# Patient Record
Sex: FEMALE | Race: WHITE | NOT HISPANIC OR LATINO | Employment: OTHER | URBAN - METROPOLITAN AREA
[De-identification: names, ages, dates, MRNs, and addresses within clinical notes are randomized per-mention and may not be internally consistent; named-entity substitution may affect disease eponyms.]

---

## 2017-05-04 ENCOUNTER — CONVERSION ENCOUNTER (OUTPATIENT)
Dept: MAMMOGRAPHY | Facility: HOSPITAL | Age: 82
End: 2017-05-04

## 2018-04-25 ENCOUNTER — OFFICE VISIT CONVERTED (OUTPATIENT)
Dept: INTERNAL MEDICINE | Facility: CLINIC | Age: 83
End: 2018-04-25
Attending: INTERNAL MEDICINE

## 2018-08-02 ENCOUNTER — HOSPITAL ENCOUNTER (OUTPATIENT)
Dept: SLEEP MEDICINE | Facility: HOSPITAL | Age: 83
Discharge: HOME | End: 2018-08-02
Attending: INTERNAL MEDICINE | Admitting: INTERNAL MEDICINE

## 2018-08-11 ENCOUNTER — HOSPITAL ENCOUNTER (OUTPATIENT)
Dept: SLEEP MEDICINE | Facility: HOSPITAL | Age: 83
Discharge: HOME | End: 2018-08-11
Attending: INTERNAL MEDICINE | Admitting: INTERNAL MEDICINE

## 2018-08-22 ENCOUNTER — HOSPITAL ENCOUNTER (OUTPATIENT)
Dept: SLEEP MEDICINE | Facility: HOSPITAL | Age: 83
Discharge: HOME | End: 2018-08-22
Attending: INTERNAL MEDICINE | Admitting: INTERNAL MEDICINE

## 2018-08-29 ENCOUNTER — CONVERSION ENCOUNTER (OUTPATIENT)
Dept: INTERNAL MEDICINE | Facility: CLINIC | Age: 83
End: 2018-08-29

## 2018-08-29 ENCOUNTER — OFFICE VISIT CONVERTED (OUTPATIENT)
Dept: INTERNAL MEDICINE | Facility: CLINIC | Age: 83
End: 2018-08-29
Attending: PHYSICIAN ASSISTANT

## 2018-10-16 ENCOUNTER — OFFICE VISIT CONVERTED (OUTPATIENT)
Dept: INTERNAL MEDICINE | Facility: CLINIC | Age: 83
End: 2018-10-16
Attending: INTERNAL MEDICINE

## 2018-10-29 ENCOUNTER — HOSPITAL ENCOUNTER (OUTPATIENT)
Dept: SLEEP MEDICINE | Facility: HOSPITAL | Age: 83
Discharge: HOME | End: 2018-10-29
Attending: INTERNAL MEDICINE | Admitting: INTERNAL MEDICINE

## 2019-01-18 ENCOUNTER — HOSPITAL ENCOUNTER (OUTPATIENT)
Dept: MAMMOGRAPHY | Facility: HOSPITAL | Age: 84
Discharge: HOME | End: 2019-01-18
Attending: INTERNAL MEDICINE

## 2019-01-28 ENCOUNTER — OFFICE VISIT CONVERTED (OUTPATIENT)
Dept: INTERNAL MEDICINE | Facility: CLINIC | Age: 84
End: 2019-01-28
Attending: INTERNAL MEDICINE

## 2019-01-28 ENCOUNTER — HOSPITAL ENCOUNTER (OUTPATIENT)
Dept: OTHER | Facility: HOSPITAL | Age: 84
Discharge: HOME | End: 2019-01-28
Attending: INTERNAL MEDICINE

## 2019-02-01 ENCOUNTER — HOSPITAL ENCOUNTER (OUTPATIENT)
Dept: ULTRASOUND IMAGING | Facility: HOSPITAL | Age: 84
Discharge: HOME | End: 2019-02-01
Attending: INTERNAL MEDICINE

## 2019-02-01 ENCOUNTER — HOSPITAL ENCOUNTER (OUTPATIENT)
Dept: OTHER | Facility: HOSPITAL | Age: 84
Discharge: HOME | End: 2019-02-01
Attending: INTERNAL MEDICINE

## 2019-02-01 LAB
ALBUMIN SERPL-MCNC: 4.3 G/DL (ref 3.5–5)
ALBUMIN/GLOB SERPL: 1.2 {RATIO} (ref 1.4–2.6)
ALP SERPL-CCNC: 47 U/L (ref 43–160)
ALT SERPL-CCNC: 20 U/L (ref 10–40)
ANION GAP SERPL CALC-SCNC: 18 MMOL/L (ref 8–19)
AST SERPL-CCNC: 23 U/L (ref 15–50)
BASOPHILS # BLD AUTO: 0.03 10*3/UL (ref 0–0.2)
BASOPHILS NFR BLD AUTO: 0.36 % (ref 0–3)
BILIRUB SERPL-MCNC: 0.74 MG/DL (ref 0.2–1.3)
BUN SERPL-MCNC: 22 MG/DL (ref 5–25)
BUN/CREAT SERPL: 24 {RATIO} (ref 6–20)
CALCIUM SERPL-MCNC: 9.5 MG/DL (ref 8.7–10.4)
CHLORIDE SERPL-SCNC: 102 MMOL/L (ref 99–111)
CHOLEST SERPL-MCNC: 202 MG/DL (ref 107–200)
CHOLEST/HDLC SERPL: 4.3 {RATIO} (ref 3–6)
CONV CO2: 27 MMOL/L (ref 22–32)
CONV TOTAL PROTEIN: 7.8 G/DL (ref 6.3–8.2)
CREAT UR-MCNC: 0.92 MG/DL (ref 0.5–0.9)
EOSINOPHIL # BLD AUTO: 0.11 10*3/UL (ref 0–0.7)
EOSINOPHIL # BLD AUTO: 1.29 % (ref 0–7)
ERYTHROCYTE [DISTWIDTH] IN BLOOD BY AUTOMATED COUNT: 12.6 % (ref 11.5–14.5)
GFR SERPLBLD BASED ON 1.73 SQ M-ARVRAT: 56 ML/MIN/{1.73_M2}
GLOBULIN UR ELPH-MCNC: 3.5 G/DL (ref 2–3.5)
GLUCOSE SERPL-MCNC: 106 MG/DL (ref 65–99)
HBA1C MFR BLD: 15.7 G/DL (ref 12–16)
HCT VFR BLD AUTO: 44.7 % (ref 37–47)
HDLC SERPL-MCNC: 47 MG/DL (ref 40–60)
LDLC SERPL CALC-MCNC: 121 MG/DL (ref 70–100)
LYMPHOCYTES # BLD AUTO: 2.05 10*3/UL (ref 1–5)
MCH RBC QN AUTO: 31.6 PG (ref 27–31)
MCHC RBC AUTO-ENTMCNC: 35.2 G/DL (ref 33–37)
MCV RBC AUTO: 89.8 FL (ref 81–99)
MONOCYTES # BLD AUTO: 0.51 10*3/UL (ref 0.2–1.2)
MONOCYTES NFR BLD AUTO: 6.26 % (ref 3–10)
NEUTROPHILS # BLD AUTO: 5.48 10*3/UL (ref 2–8)
NEUTROPHILS NFR BLD AUTO: 67 % (ref 30–85)
NRBC BLD AUTO-RTO: 0 % (ref 0–0.01)
OSMOLALITY SERPL CALC.SUM OF ELEC: 300 MOSM/KG (ref 273–304)
PLATELET # BLD AUTO: 186 10*3/UL (ref 130–400)
PMV BLD AUTO: 9.7 FL (ref 7.4–10.4)
POTASSIUM SERPL-SCNC: 4.4 MMOL/L (ref 3.5–5.3)
RBC # BLD AUTO: 4.97 10*6/UL (ref 4.2–5.4)
SODIUM SERPL-SCNC: 143 MMOL/L (ref 135–147)
T4 FREE SERPL-MCNC: 1.1 NG/DL (ref 0.9–1.8)
TRIGL SERPL-MCNC: 172 MG/DL (ref 40–150)
TSH SERPL-ACNC: 1.24 M[IU]/L (ref 0.27–4.2)
VARIANT LYMPHS NFR BLD MANUAL: 25.1 % (ref 20–45)
VLDLC SERPL-MCNC: 34 MG/DL (ref 5–37)
WBC # BLD AUTO: 8.18 10*3/UL (ref 4.8–10.8)

## 2019-03-19 ENCOUNTER — OFFICE VISIT CONVERTED (OUTPATIENT)
Dept: INTERNAL MEDICINE | Facility: CLINIC | Age: 84
End: 2019-03-19
Attending: INTERNAL MEDICINE

## 2019-03-19 ENCOUNTER — HOSPITAL ENCOUNTER (OUTPATIENT)
Dept: OTHER | Facility: HOSPITAL | Age: 84
Discharge: HOME | End: 2019-03-19
Attending: INTERNAL MEDICINE

## 2019-03-22 LAB
AMOXICILLIN+CLAV SUSC ISLT: 4
AMPICILLIN SUSC ISLT: 8
AMPICILLIN+SULBAC SUSC ISLT: 4
BACTERIA UR CULT: ABNORMAL
CEFAZOLIN SUSC ISLT: <=4
CEFEPIME SUSC ISLT: <=1
CEFTAZIDIME SUSC ISLT: <=1
CEFTRIAXONE SUSC ISLT: <=1
CEFUROXIME ORAL SUSC ISLT: 4
CEFUROXIME PARENTER SUSC ISLT: 4
CIPROFLOXACIN SUSC ISLT: <=0.25
ERTAPENEM SUSC ISLT: <=0.5
GENTAMICIN SUSC ISLT: <=1
LEVOFLOXACIN SUSC ISLT: <=0.12
NITROFURANTOIN SUSC ISLT: <=16
TETRACYCLINE SUSC ISLT: <=1
TMP SMX SUSC ISLT: <=20
TOBRAMYCIN SUSC ISLT: <=1

## 2019-04-25 ENCOUNTER — HOSPITAL ENCOUNTER (OUTPATIENT)
Dept: SLEEP MEDICINE | Facility: HOSPITAL | Age: 84
Discharge: HOME | End: 2019-04-25
Attending: INTERNAL MEDICINE | Admitting: INTERNAL MEDICINE

## 2019-07-25 ENCOUNTER — CONVERSION ENCOUNTER (OUTPATIENT)
Dept: INTERNAL MEDICINE | Facility: CLINIC | Age: 84
End: 2019-07-25

## 2019-07-25 ENCOUNTER — OFFICE VISIT CONVERTED (OUTPATIENT)
Dept: INTERNAL MEDICINE | Facility: CLINIC | Age: 84
End: 2019-07-25
Attending: INTERNAL MEDICINE

## 2019-11-18 ENCOUNTER — OFFICE VISIT CONVERTED (OUTPATIENT)
Dept: INTERNAL MEDICINE | Facility: CLINIC | Age: 84
End: 2019-11-18
Attending: INTERNAL MEDICINE

## 2019-11-18 ENCOUNTER — CONVERSION ENCOUNTER (OUTPATIENT)
Dept: INTERNAL MEDICINE | Facility: CLINIC | Age: 84
End: 2019-11-18

## 2019-11-18 ENCOUNTER — HOSPITAL ENCOUNTER (OUTPATIENT)
Dept: OTHER | Facility: HOSPITAL | Age: 84
Discharge: HOME | End: 2019-11-18
Attending: INTERNAL MEDICINE

## 2019-11-18 LAB
ALBUMIN SERPL-MCNC: 4.3 G/DL (ref 3.5–5)
ALBUMIN/GLOB SERPL: 1.3 {RATIO} (ref 1.4–2.6)
ALP SERPL-CCNC: 48 U/L (ref 43–160)
ALT SERPL-CCNC: 23 U/L (ref 10–40)
ANION GAP SERPL CALC-SCNC: 20 MMOL/L (ref 8–19)
AST SERPL-CCNC: 32 U/L (ref 15–50)
BASOPHILS # BLD AUTO: 0.06 10*3/UL (ref 0–0.2)
BASOPHILS NFR BLD AUTO: 0.8 % (ref 0–3)
BILIRUB SERPL-MCNC: 1.11 MG/DL (ref 0.2–1.3)
BUN SERPL-MCNC: 21 MG/DL (ref 5–25)
BUN/CREAT SERPL: 24 {RATIO} (ref 6–20)
CALCIUM SERPL-MCNC: 10.4 MG/DL (ref 8.7–10.4)
CHLORIDE SERPL-SCNC: 100 MMOL/L (ref 99–111)
CHOLEST SERPL-MCNC: 217 MG/DL (ref 107–200)
CHOLEST/HDLC SERPL: 4.1 {RATIO} (ref 3–6)
CONV ABS IMM GRAN: 0.03 10*3/UL (ref 0–0.2)
CONV CO2: 27 MMOL/L (ref 22–32)
CONV IMMATURE GRAN: 0.4 % (ref 0–1.8)
CONV TOTAL PROTEIN: 7.6 G/DL (ref 6.3–8.2)
CREAT UR-MCNC: 0.87 MG/DL (ref 0.5–0.9)
DEPRECATED RDW RBC AUTO: 45.2 FL (ref 36.4–46.3)
EOSINOPHIL # BLD AUTO: 0.11 10*3/UL (ref 0–0.7)
EOSINOPHIL # BLD AUTO: 1.4 % (ref 0–7)
ERYTHROCYTE [DISTWIDTH] IN BLOOD BY AUTOMATED COUNT: 13.1 % (ref 11.7–14.4)
GFR SERPLBLD BASED ON 1.73 SQ M-ARVRAT: 59 ML/MIN/{1.73_M2}
GLOBULIN UR ELPH-MCNC: 3.3 G/DL (ref 2–3.5)
GLUCOSE SERPL-MCNC: 94 MG/DL (ref 65–99)
HCT VFR BLD AUTO: 45.2 % (ref 37–47)
HDLC SERPL-MCNC: 53 MG/DL (ref 40–60)
HGB BLD-MCNC: 15.4 G/DL (ref 12–16)
LDLC SERPL CALC-MCNC: 131 MG/DL (ref 70–100)
LYMPHOCYTES # BLD AUTO: 2.44 10*3/UL (ref 1–5)
LYMPHOCYTES NFR BLD AUTO: 30.8 % (ref 20–45)
MCH RBC QN AUTO: 32.2 PG (ref 27–31)
MCHC RBC AUTO-ENTMCNC: 34.1 G/DL (ref 33–37)
MCV RBC AUTO: 94.4 FL (ref 81–99)
MONOCYTES # BLD AUTO: 0.57 10*3/UL (ref 0.2–1.2)
MONOCYTES NFR BLD AUTO: 7.2 % (ref 3–10)
NEUTROPHILS # BLD AUTO: 4.71 10*3/UL (ref 2–8)
NEUTROPHILS NFR BLD AUTO: 59.4 % (ref 30–85)
NRBC CBCN: 0 % (ref 0–0.7)
OSMOLALITY SERPL CALC.SUM OF ELEC: 299 MOSM/KG (ref 273–304)
PLATELET # BLD AUTO: 201 10*3/UL (ref 130–400)
PMV BLD AUTO: 12.7 FL (ref 9.4–12.3)
POTASSIUM SERPL-SCNC: 4.4 MMOL/L (ref 3.5–5.3)
RBC # BLD AUTO: 4.79 10*6/UL (ref 4.2–5.4)
SODIUM SERPL-SCNC: 143 MMOL/L (ref 135–147)
T4 FREE SERPL-MCNC: 1.1 NG/DL (ref 0.9–1.8)
TRIGL SERPL-MCNC: 167 MG/DL (ref 40–150)
TSH SERPL-ACNC: 1.45 M[IU]/L (ref 0.27–4.2)
VLDLC SERPL-MCNC: 33 MG/DL (ref 5–37)
WBC # BLD AUTO: 7.92 10*3/UL (ref 4.8–10.8)

## 2020-02-18 ENCOUNTER — CONVERSION ENCOUNTER (OUTPATIENT)
Dept: INTERNAL MEDICINE | Facility: CLINIC | Age: 85
End: 2020-02-18

## 2020-02-18 ENCOUNTER — OFFICE VISIT CONVERTED (OUTPATIENT)
Dept: INTERNAL MEDICINE | Facility: CLINIC | Age: 85
End: 2020-02-18
Attending: INTERNAL MEDICINE

## 2020-02-20 ENCOUNTER — HOSPITAL ENCOUNTER (OUTPATIENT)
Dept: OTHER | Facility: HOSPITAL | Age: 85
Discharge: HOME | End: 2020-02-20
Attending: INTERNAL MEDICINE

## 2020-02-20 LAB
CREAT BLD-MCNC: 0.8 MG/DL (ref 0.6–1.4)
GFR SERPLBLD BASED ON 1.73 SQ M-ARVRAT: >60 ML/MIN/{1.73_M2}

## 2020-07-14 ENCOUNTER — OFFICE VISIT CONVERTED (OUTPATIENT)
Dept: INTERNAL MEDICINE | Facility: CLINIC | Age: 85
End: 2020-07-14
Attending: NURSE PRACTITIONER

## 2020-07-14 ENCOUNTER — HOSPITAL ENCOUNTER (OUTPATIENT)
Dept: OTHER | Facility: HOSPITAL | Age: 85
Discharge: HOME | End: 2020-07-14
Attending: NURSE PRACTITIONER

## 2020-07-14 LAB
ALBUMIN SERPL-MCNC: 4.5 G/DL (ref 3.5–5)
ALBUMIN/GLOB SERPL: 1.6 {RATIO} (ref 1.4–2.6)
ALP SERPL-CCNC: 44 U/L (ref 43–160)
ALT SERPL-CCNC: 20 U/L (ref 10–40)
ANION GAP SERPL CALC-SCNC: 17 MMOL/L (ref 8–19)
AST SERPL-CCNC: 24 U/L (ref 15–50)
BASOPHILS # BLD AUTO: 0.05 10*3/UL (ref 0–0.2)
BASOPHILS NFR BLD AUTO: 0.7 % (ref 0–3)
BILIRUB SERPL-MCNC: 0.57 MG/DL (ref 0.2–1.3)
BUN SERPL-MCNC: 25 MG/DL (ref 5–25)
BUN/CREAT SERPL: 27 {RATIO} (ref 6–20)
CALCIUM SERPL-MCNC: 10.2 MG/DL (ref 8.7–10.4)
CHLORIDE SERPL-SCNC: 100 MMOL/L (ref 99–111)
CHOLEST SERPL-MCNC: 221 MG/DL (ref 107–200)
CHOLEST/HDLC SERPL: 4.8 {RATIO} (ref 3–6)
CONV ABS IMM GRAN: 0.03 10*3/UL (ref 0–0.2)
CONV CO2: 29 MMOL/L (ref 22–32)
CONV IMMATURE GRAN: 0.4 % (ref 0–1.8)
CONV TOTAL PROTEIN: 7.4 G/DL (ref 6.3–8.2)
CREAT UR-MCNC: 0.92 MG/DL (ref 0.5–0.9)
DEPRECATED RDW RBC AUTO: 48.8 FL (ref 36.4–46.3)
EOSINOPHIL # BLD AUTO: 0.12 10*3/UL (ref 0–0.7)
EOSINOPHIL # BLD AUTO: 1.7 % (ref 0–7)
ERYTHROCYTE [DISTWIDTH] IN BLOOD BY AUTOMATED COUNT: 13.5 % (ref 11.7–14.4)
GFR SERPLBLD BASED ON 1.73 SQ M-ARVRAT: 55 ML/MIN/{1.73_M2}
GLOBULIN UR ELPH-MCNC: 2.9 G/DL (ref 2–3.5)
GLUCOSE SERPL-MCNC: 90 MG/DL (ref 65–99)
HCT VFR BLD AUTO: 46.2 % (ref 37–47)
HDLC SERPL-MCNC: 46 MG/DL (ref 40–60)
HGB BLD-MCNC: 14.5 G/DL (ref 12–16)
LDLC SERPL CALC-MCNC: 133 MG/DL (ref 70–100)
LYMPHOCYTES # BLD AUTO: 2.06 10*3/UL (ref 1–5)
LYMPHOCYTES NFR BLD AUTO: 28.9 % (ref 20–45)
MCH RBC QN AUTO: 30.4 PG (ref 27–31)
MCHC RBC AUTO-ENTMCNC: 31.4 G/DL (ref 33–37)
MCV RBC AUTO: 96.9 FL (ref 81–99)
MONOCYTES # BLD AUTO: 0.49 10*3/UL (ref 0.2–1.2)
MONOCYTES NFR BLD AUTO: 6.9 % (ref 3–10)
NEUTROPHILS # BLD AUTO: 4.38 10*3/UL (ref 2–8)
NEUTROPHILS NFR BLD AUTO: 61.4 % (ref 30–85)
NRBC CBCN: 0 % (ref 0–0.7)
OSMOLALITY SERPL CALC.SUM OF ELEC: 298 MOSM/KG (ref 273–304)
PLATELET # BLD AUTO: 208 10*3/UL (ref 130–400)
PMV BLD AUTO: 12.3 FL (ref 9.4–12.3)
POTASSIUM SERPL-SCNC: 4.2 MMOL/L (ref 3.5–5.3)
RBC # BLD AUTO: 4.77 10*6/UL (ref 4.2–5.4)
SODIUM SERPL-SCNC: 142 MMOL/L (ref 135–147)
T4 FREE SERPL-MCNC: 1 NG/DL (ref 0.9–1.8)
TRIGL SERPL-MCNC: 211 MG/DL (ref 40–150)
TSH SERPL-ACNC: 1.08 M[IU]/L (ref 0.27–4.2)
VLDLC SERPL-MCNC: 42 MG/DL (ref 5–37)
WBC # BLD AUTO: 7.13 10*3/UL (ref 4.8–10.8)

## 2020-07-15 ENCOUNTER — HOSPITAL ENCOUNTER (OUTPATIENT)
Dept: OTHER | Facility: HOSPITAL | Age: 85
Discharge: HOME | End: 2020-07-15
Attending: NURSE PRACTITIONER

## 2020-07-15 ENCOUNTER — CONVERSION ENCOUNTER (OUTPATIENT)
Dept: NEUROLOGY | Facility: CLINIC | Age: 85
End: 2020-07-15

## 2020-07-15 ENCOUNTER — OFFICE VISIT CONVERTED (OUTPATIENT)
Dept: NEUROLOGY | Facility: CLINIC | Age: 85
End: 2020-07-15
Attending: NURSE PRACTITIONER

## 2020-07-15 LAB — HCYS SERPL-SCNC: 10.4 UMOL/L (ref 3.7–13.9)

## 2020-07-16 LAB
FOLATE SERPL-MCNC: >20 NG/ML (ref 4.8–20)
VIT B12 SERPL-MCNC: 1234 PG/ML (ref 211–911)

## 2020-07-19 LAB — METHYLMALONATE SERPL-SCNC: 209 NMOL/L (ref 0–378)

## 2020-08-10 ENCOUNTER — HOSPITAL ENCOUNTER (OUTPATIENT)
Dept: CARDIOLOGY | Facility: HOSPITAL | Age: 85
Discharge: HOME | End: 2020-08-10
Attending: NURSE PRACTITIONER

## 2020-09-11 ENCOUNTER — OFFICE VISIT CONVERTED (OUTPATIENT)
Dept: INTERNAL MEDICINE | Facility: CLINIC | Age: 85
End: 2020-09-11
Attending: PHYSICIAN ASSISTANT

## 2020-09-16 ENCOUNTER — OFFICE VISIT CONVERTED (OUTPATIENT)
Dept: NEUROLOGY | Facility: CLINIC | Age: 85
End: 2020-09-16
Attending: NURSE PRACTITIONER

## 2020-10-23 ENCOUNTER — OFFICE VISIT CONVERTED (OUTPATIENT)
Dept: INTERNAL MEDICINE | Facility: CLINIC | Age: 85
End: 2020-10-23
Attending: INTERNAL MEDICINE

## 2020-10-23 ENCOUNTER — CONVERSION ENCOUNTER (OUTPATIENT)
Dept: INTERNAL MEDICINE | Facility: CLINIC | Age: 85
End: 2020-10-23

## 2020-12-04 ENCOUNTER — CONVERSION ENCOUNTER (OUTPATIENT)
Dept: INTERNAL MEDICINE | Facility: CLINIC | Age: 85
End: 2020-12-04

## 2020-12-04 ENCOUNTER — OFFICE VISIT CONVERTED (OUTPATIENT)
Dept: INTERNAL MEDICINE | Facility: CLINIC | Age: 85
End: 2020-12-04
Attending: INTERNAL MEDICINE

## 2020-12-17 ENCOUNTER — OFFICE VISIT CONVERTED (OUTPATIENT)
Dept: NEUROLOGY | Facility: CLINIC | Age: 85
End: 2020-12-17
Attending: NURSE PRACTITIONER

## 2021-02-26 ENCOUNTER — HOSPITAL ENCOUNTER (OUTPATIENT)
Dept: VACCINE CLINIC | Facility: HOSPITAL | Age: 86
Discharge: HOME | End: 2021-02-26
Attending: INTERNAL MEDICINE

## 2021-03-05 ENCOUNTER — HOSPITAL ENCOUNTER (OUTPATIENT)
Dept: OTHER | Facility: HOSPITAL | Age: 86
Discharge: HOME | End: 2021-03-05
Attending: INTERNAL MEDICINE

## 2021-03-05 ENCOUNTER — OFFICE VISIT CONVERTED (OUTPATIENT)
Dept: INTERNAL MEDICINE | Facility: CLINIC | Age: 86
End: 2021-03-05
Attending: INTERNAL MEDICINE

## 2021-03-05 ENCOUNTER — CONVERSION ENCOUNTER (OUTPATIENT)
Dept: INTERNAL MEDICINE | Facility: CLINIC | Age: 86
End: 2021-03-05

## 2021-03-05 LAB
ALBUMIN SERPL-MCNC: 4.3 G/DL (ref 3.5–5)
ALBUMIN/GLOB SERPL: 1.2 {RATIO} (ref 1.4–2.6)
ALP SERPL-CCNC: 50 U/L (ref 43–160)
ALT SERPL-CCNC: 28 U/L (ref 10–40)
ANION GAP SERPL CALC-SCNC: 16 MMOL/L (ref 8–19)
AST SERPL-CCNC: 29 U/L (ref 15–50)
BASOPHILS # BLD AUTO: 0.05 10*3/UL (ref 0–0.2)
BASOPHILS NFR BLD AUTO: 0.7 % (ref 0–3)
BILIRUB SERPL-MCNC: 0.87 MG/DL (ref 0.2–1.3)
BILIRUB UR QL STRIP: NEGATIVE
BUN SERPL-MCNC: 27 MG/DL (ref 5–25)
BUN/CREAT SERPL: 28 {RATIO} (ref 6–20)
CALCIUM SERPL-MCNC: 10 MG/DL (ref 8.7–10.4)
CHLORIDE SERPL-SCNC: 100 MMOL/L (ref 99–111)
CHOLEST SERPL-MCNC: 199 MG/DL (ref 107–200)
CHOLEST/HDLC SERPL: 3.8 {RATIO} (ref 3–6)
COLOR UR: YELLOW
CONV ABS IMM GRAN: 0.04 10*3/UL (ref 0–0.2)
CONV CLARITY OF URINE: CLEAR
CONV CO2: 29 MMOL/L (ref 22–32)
CONV IMMATURE GRAN: 0.5 % (ref 0–1.8)
CONV PROTEIN IN URINE BY AUTOMATED TEST STRIP: NEGATIVE
CONV TOTAL PROTEIN: 7.8 G/DL (ref 6.3–8.2)
CONV UROBILINOGEN IN URINE BY AUTOMATED TEST STRIP: NORMAL
CREAT UR-MCNC: 0.96 MG/DL (ref 0.5–0.9)
DEPRECATED RDW RBC AUTO: 45.7 FL (ref 36.4–46.3)
EOSINOPHIL # BLD AUTO: 0.12 10*3/UL (ref 0–0.7)
EOSINOPHIL # BLD AUTO: 1.6 % (ref 0–7)
ERYTHROCYTE [DISTWIDTH] IN BLOOD BY AUTOMATED COUNT: 13.4 % (ref 11.7–14.4)
GFR SERPLBLD BASED ON 1.73 SQ M-ARVRAT: 52 ML/MIN/{1.73_M2}
GLOBULIN UR ELPH-MCNC: 3.5 G/DL (ref 2–3.5)
GLUCOSE SERPL-MCNC: 98 MG/DL (ref 65–99)
GLUCOSE UR QL: NEGATIVE
HCT VFR BLD AUTO: 45.5 % (ref 37–47)
HDLC SERPL-MCNC: 52 MG/DL (ref 40–60)
HGB BLD-MCNC: 14.6 G/DL (ref 12–16)
HGB UR QL STRIP: NEGATIVE
KETONES UR QL STRIP: NEGATIVE
LDLC SERPL CALC-MCNC: 119 MG/DL (ref 70–100)
LEUKOCYTE ESTERASE UR QL STRIP: NEGATIVE
LYMPHOCYTES # BLD AUTO: 2.04 10*3/UL (ref 1–5)
LYMPHOCYTES NFR BLD AUTO: 26.8 % (ref 20–45)
MCH RBC QN AUTO: 30.1 PG (ref 27–31)
MCHC RBC AUTO-ENTMCNC: 32.1 G/DL (ref 33–37)
MCV RBC AUTO: 93.8 FL (ref 81–99)
MONOCYTES # BLD AUTO: 0.64 10*3/UL (ref 0.2–1.2)
MONOCYTES NFR BLD AUTO: 8.4 % (ref 3–10)
NEUTROPHILS # BLD AUTO: 4.73 10*3/UL (ref 2–8)
NEUTROPHILS NFR BLD AUTO: 62 % (ref 30–85)
NITRITE UR QL STRIP: NEGATIVE
NRBC CBCN: 0 % (ref 0–0.7)
OSMOLALITY SERPL CALC.SUM OF ELEC: 297 MOSM/KG (ref 273–304)
PH UR STRIP.AUTO: 5 [PH]
PLATELET # BLD AUTO: 238 10*3/UL (ref 130–400)
PMV BLD AUTO: 11.5 FL (ref 9.4–12.3)
POTASSIUM SERPL-SCNC: 3.9 MMOL/L (ref 3.5–5.3)
RBC # BLD AUTO: 4.85 10*6/UL (ref 4.2–5.4)
SODIUM SERPL-SCNC: 141 MMOL/L (ref 135–147)
SP GR UR: 1.02
TRIGL SERPL-MCNC: 142 MG/DL (ref 40–150)
TSH SERPL-ACNC: 1.37 M[IU]/L (ref 0.27–4.2)
VLDLC SERPL-MCNC: 28 MG/DL (ref 5–37)
WBC # BLD AUTO: 7.62 10*3/UL (ref 4.8–10.8)

## 2021-03-15 ENCOUNTER — OFFICE VISIT CONVERTED (OUTPATIENT)
Dept: NEUROLOGY | Facility: CLINIC | Age: 86
End: 2021-03-15
Attending: NURSE PRACTITIONER

## 2021-03-26 ENCOUNTER — HOSPITAL ENCOUNTER (OUTPATIENT)
Dept: VACCINE CLINIC | Facility: HOSPITAL | Age: 86
Discharge: HOME | End: 2021-03-26
Attending: INTERNAL MEDICINE

## 2021-04-26 ENCOUNTER — OFFICE VISIT CONVERTED (OUTPATIENT)
Dept: INTERNAL MEDICINE | Facility: CLINIC | Age: 86
End: 2021-04-26
Attending: NURSE PRACTITIONER

## 2021-04-26 ENCOUNTER — CONVERSION ENCOUNTER (OUTPATIENT)
Dept: INTERNAL MEDICINE | Facility: CLINIC | Age: 86
End: 2021-04-26

## 2021-05-07 ENCOUNTER — HOSPITAL ENCOUNTER (OUTPATIENT)
Dept: OTHER | Facility: HOSPITAL | Age: 86
Discharge: HOME | End: 2021-05-07
Attending: NURSE PRACTITIONER

## 2021-05-10 NOTE — H&P
History and Physical      Patient Name: Osiris Vila   Patient ID: [id number]   Sex: Female   YOB: 1931    Primary Care Provider: Ave Raya MD   Referring Provider: Ave Raya MD    Visit Date: July 15, 2020    Provider: SUNDAR Horta   Location: [facility]   Location Address: [facility] [place] [employer]  [address] [place] [address]  [id number]   Location Phone: [phone]          Chief Complaint     memory concerns       History Of Present Illness  Osiris Vila is a 88 year old /White female who presents today to [facility] today referred by Ave Raya MD for evaluation of memory. States that memory loss was first noticed about 3 years ago. Family has noticed any memory difficulty. Is having difficulty with short term memory. Endorses difficulty remembering appointments. Endorses difficulty handling financial affairs, such as balancing checkbook and paying bills timely. Does not feel as if she could learn a new tool, appliance or gadget. Endorses decreased interest in hobbies or activities. Endorses depression. Denies homicidal ideation and suicidal ideation. Endorses anxiety. Does experience repetitive questioning. Denies difficulty with judgment, bad financial decisions, and impulsivity.   Does not live alone. Does not drive.   DENIES/ENDORSES family history of Alzheimer's Disease.      Son states that she will leave things out on the counter and forget that they're there. They are concerned about her ability to take care of herself.  She no longer uses the stove.  Lives with daughter but daughter works during the day.     MRI Brain:   There is mild prominence of the cerebral sulci and fissures indicating cortical atrophy.  The   ventricles and basal cisterns are well maintained and normal.  There is an area of encephalomalacia   in the left occipital-parietal lobe consistent with an old infarct.  There has been interval   development of a small  porencephalic cyst located posterior to the corpus callosum in the   anteromedial left occipital lobe measuring 1.3 cm felt to be due to old ischemic changes.  There   are abnormal signal changes in the periventricular white matter tracts and subcortical white matter   consistent with chronic microvascular ischemia.  There are also superimposed tiny old lacunar   infarcts noted within the periventricular white matter and basal ganglia.  There is no restricted   diffusion to indicate acute ischemia.  There is no abnormal contrast enhancement.  There is no   acute hemorrhage, midline shift, or suspicious extra-axial fluid collections.  There is normal T2   weighted signal seen within the intracranial arteries and the major dural sinuses.  The orbital   contents are normal.  The paranasal and mastoid sinuses are clear.    MoCA 15/30       Past Medical History  Actinic keratosis; Allergic rhinitis; Arthritis; Back pain; Colitis, Ulcerative; Deafness; GERD (gastroesophageal reflux disease); Hearing aid worn; Hearing loss; Hernia; Hiatal hernia; Hypertension; Hypertension, Benign Essential; Iron deficiency; Leg swelling; Lumbago; Seborrheic dermatitis; Sinus Trouble; unspecified; SOB (shortness of breath); Vitamin D Deficiency         Past Surgical History  Bladder Surg.; Cataract surgery; Cholecystectomy; Colonoscopy; EGD; Hernia (Hiatal); Hysterectomy; Knee replacement, left; Sinus Surgery         Medication List  8 Hour Pain Reliever 650 mg oral tablet extended release; Allergy Injections; Calcium Citrate + D 315-200 mg-unit oral tablet; chlorthalidone 25 mg oral tablet; Eliquis 5 mg oral tablet; hydrocodone-acetaminophen 5-325 mg oral tablet; lisinopril 10 mg oral tablet; multivitamin oral tablet; mupirocin 2 % topical ointment; Omega-3 350 mg-235 mg- 90 mg-597 mg oral capsule,delayed release(DR/EC); sertraline 100 mg oral tablet; Vitamin B-12 oral; Vitamin C 500 mg oral tablet         Allergy List  atenolol;  bisoprolol fumarate; clonidine; diltiazem HCl; doxycycline hyclate; erythromycin; fosinopril; Keflex; lisinopril; Macrobid; Molds; oxybutynin; TETRACYCLINES; triamterene; trimethoprim; verapamil; yeast       Allergies Reconciled  Family Medical History  Pancreatic Neoplasm, Malignant; Family history of Arthritis; Family history of cancer; Family history of heart disease; Family history of diabetes mellitus         Reproductive History   3 Para 3 0 0 0 & Postmenopausal       Social History  Alcohol (Never); Retired; Tobacco (Former)         Immunizations  Name Date Admin   Influenza    [id number]    Prevnar 13          Review of Systems  · Constitutional  o Admits  o : fatigue, weight gain  o Denies  o : chills, excessive sweating, fever, sycope/passing out, weight loss  · Eyes  o Denies  o : changes in vision, blurry vision, double vision  · HENT  o Admits  o : loss of hearing, ear aches, seasonal allergies  o Denies  o : ringing in the ears, sore throat, nasal congestion, sinus pain, nose bleeds  · Cardiovascular  o Denies  o : blood clots, swollen legs, anemia, easy burising or bleeding, transfusions  · Respiratory  o Denies  o : shortness of breath, dry cough, productive cough, pneumonia, COPD  · Gastrointestinal  o Denies  o : difficulty swallowing, reflux  · Genitourinary  o Admits  o : incontinence  · Neurologic  o Denies  o : headache, seizure, stroke, tremor, loss of balance, falls, dizziness/vertigo, difficulty with sleep, numbness/tingling/paresthesia , difficulty with coordination, difficulty with dexterity, weakness  · Musculoskeletal  o Admits  o : joint pain  o Denies  o : neck stiffness/pain, swollen lymph nodes, muscle aches, weakness, spasms, sciatica, pain radiating in arm, pain radiating in leg, low back pain  · Endocrine  o Denies  o : diabetes, thyroid disorder  · Psychiatric  o Denies  o : anxiety, depression      Vitals  Date Time BP Position Site L\R Cuff Size HR RR TEMP (F) WT  HT   "BMI kg/m2 BSA m2 O2 Sat        07/15/2020 08:05 /80 Sitting    66 - R  96.9 192lbs 2oz 5'  2\" 35.14 1.95           Physical Examination  · Constitutional  o Appearance  o : well-nourished, well groomed, in no apparent distress  · Eyes  o Pupils and Irises  o : Pupils equal, round, and reactive to light and accommodation bilaterally  · Respiratory  o Auscultation of Lungs  o : Lungs were clear to ascultation bilaterally. No wheezes, rhonchi or rales were appreciated.  · Cardiovascular  o Heart  o :   § Auscultation of Heart  § : Regular rate and rhythm, no murmurs, gallops or rubs were appreciated.  o Peripheral Vascular System  o :   § Extremities  § : No peripheral edema was appreciated  · Musculoskeletal  o General  o : Normal bulk and normal tone throughout. 5/5 motor strength throughout and symmetric.   · Neurologic  o Cranial Nerves  o : Pupils are equal, round and reactive to light. Extraocular movements are intact. Visual fields are full. Fundoscopic examination reveals sharp disc bilaterally. Sensation in the V1-V3 distribution is intact and symmetric. Muscles of mastication are strong and symmetric. Muscles of facial expression are strong and symmetric. Hearing is intact. Palatal raise is intact and symmetric. Uvula is midline. Shoulder shrug is strong. Tongue protrudes in the midline.  o Motor Examination  o :   § RUE Strength  § : strength normal  § LUE Strength  § : strength normal  § RLE Strength  § : strength normal  § LLE Strength  § : strength normal  o Reflexes  o : 2+ reflexes throughout and symmetric. Negative Lin. Negative Babinski.   o Sensation  o : Intact sensation to light touch, pinprick, vibration and proprioception throughout.  o Gait and Station  o :   § Gait Screening  § : Antalgic gait  o Coordination  o : Intact finger to nose and heel to shin. Rapid alternating movements are intact in the upper and lower " extremities.          Assessment  · Dementia     294.20/F03.90  Significant vascular disease and old stroke on MRI brain. Likely a vascular dementia. Will order carotid doppler to evaluate for occlusive disease secondary to severe cerebrovascular disease. Will consider statin in follow-up. Will order labs to look for reversible causes of memory loss. Will refer to the Alzheimer [employer] for resources and education for her family. Will start donepezil for memory.   · Stroke     434.91/I63.9    Problems Reconciled  Plan  · Orders  o Folate level (49881) - 294.20/F03.90, 434.91/I63.9 - 07/15/2020  o Homocysteine (78142) - 294.20/F03.90, 434.91/I63.9 - 07/15/2020  o Methylmalonic acid (78108) - 294.20/F03.90, 434.91/I63.9 - 07/15/2020  o Vitamin B12 level (75075) - 294.20/F03.90, 434.91/I63.9 - 07/15/2020  o Carotid Doppler Bilateral HMH (72075) - 434.91/I63.9 - 07/15/2020  · Medications  o donepezil 10 mg oral tablet   SIG: take 1/2 tablet (5 mg) by oral route once daily in the evening for 2 weeks, then increase to 1 tablet (10mg) qHS.   DISP: (30) tablets with 2 refills  Prescribed on 07/15/2020     o Medications have been Reconciled  o Transition of Care or Provider Policy  · Instructions  o Encouraged to follow-up with Primary Care Provider for preventative care.  o Call or Return if symptoms worsen or persist.   o Follow Up in 6 weeks.  o Greater than 60 minutes spent face to face with the patient with greater than 50% of that time used for education regarding stroke and dementia.   · Disposition  o Call or Return if symptoms worsen or persist.            Electronically Signed by: SUNDAR Horta -Author on July 15, 2020 02:47:50 PM

## 2021-05-13 NOTE — PROGRESS NOTES
"   Progress Note      Patient Name: Osiris Vila   Patient ID: [id number]   Sex: Female   YOB: 1931    Primary Care Provider: Ave Raya MD   Referring Provider: Ave Raya MD    Visit Date: September 11, 2020    Provider: Ashleigh Villeda PA-C   Location: [facility]   Location Address: [address] [place] [address], [address]  [place], [place]  [id number]   Location Phone: [phone]          Chief Complaint  · scalp itching      History Of Present Illness  Osiris Vila is a 88 year old /White female who presents for evaluation and treatment of:      scalping itching, burning and losing hair that started years ago. She states she has not tried anything in a \"serious fashion\" recently. She states this comes every year in August and she thinks it needs to be taken care of by a physician. She states the hair loss has been gradual. She states she scratches it often and it gets red. She states she has been off allergy shots for 2 years. She states she is using coconut oil shampoo. She states her ears itch too and she gets fungus growing on her ears.  She has seen Dermatology in the past but told nothing was wrong.     Denies taking anything for allergies now, spreading of itching and burning on face and ears.  Denies runny nose, nasal congestion, sore throat, fever.       Past Medical History  Disease Name Date Onset Notes   Actinic keratosis 09/20/2016 lesions frozen today   Allergic rhinitis --  --    Arthritis --  --    Back pain --  --    Colitis, Ulcerative --  --    Deafness --  --    GERD (gastroesophageal reflux disease) 09/20/2016 omeprazole for 4-6 months old, discussed risks   Hearing aid worn --  --    Hearing loss --  --    Hernia --  --    Hiatal hernia --  --    Hypertension --  --    Hypertension, Benign Essential --  --    Iron deficiency --  --    Leg swelling --  --    Lumbago 06/23/2015 chronic, will refill meds when she is out of the meds she gets from " [facility] will need UDS at that time completed consent today   Seborrheic dermatitis 06/23/2015 will continue treatment with ketakonazole shampoo and have her follow up with derm   Sinus Trouble; unspecified --  --    SOB (shortness of breath) --  --    Vitamin D Deficiency --  --          Past Surgical History  Procedure Name Date Notes   Bladder Surg. 1989 --    Cataract surgery --  --    Cholecystectomy 1973 --    Colonoscopy 2013 --    EGD 2013 --    Hernia (Hiatal) --  --    Hysterectomy 1986 --    Knee replacement, left 2012 --    Sinus Surgery 1993, 1998, 2000 --          Medication List  Name Date Started Instructions   4 Wheel Walker with Seat 1 Walker 07/27/2020 Use to help with mobility as needed   8 Hour Pain Reliever 650 mg oral tablet extended release  --    Allergy Injections  As Directed   Calcium Citrate + D 315-200 mg-unit oral tablet  --    chlorthalidone 25 mg oral tablet 08/03/2020 TAKE ONE TABLET BY MOUTH EVERY MORNING   donepezil 10 mg oral tablet 07/15/2020 take 1/2 tablet (5 mg) by oral route once daily in the evening for 2 weeks, then increase to 1 tablet (10mg) qHS.   Eliquis 5 mg oral tablet 08/14/2020 Take 1 tablet by mouth twice daily   hydrocodone-acetaminophen 5-325 mg oral tablet 08/18/2020 take 1 tablet by oral route every 6 hours as needed for pain   lisinopril 10 mg oral tablet 08/24/2020 take 1 tablet (10 mg) by oral route once daily   multivitamin oral tablet  take 1 tablet by oral route daily   mupirocin 2 % topical ointment 11/18/2019 apply a small amount to the affected area by topical route 3 times per day   Omega-3 350 mg-235 mg- 90 mg-597 mg oral capsule,delayed release(DR/EC)  --    sertraline 100 mg oral tablet 02/18/2020 take 1 tablet (100 mg) by oral route once daily   Vitamin B-12 oral  --    Vitamin C 500 mg oral tablet  --          Allergy List  Allergen Name Date Reaction Notes   atenolol --  --  --    bisoprolol fumarate --  --  --    clonidine --  --  --    diltiazem  HCl --  --  --    doxycycline hyclate --  --  --    erythromycin --  --  --    fosinopril --  --  --    Keflex --  --  --    lisinopril --  --  --    Macrobid --  --  --    Molds --  --  --    oxybutynin --  --  --    TETRACYCLINES --  --  --    triamterene --  --  --    trimethoprim --  --  --    verapamil --  --  --    yeast --  --  --        Allergies Reconciled  Family Medical History  Disease Name Relative/Age Notes   Pancreatic Neoplasm, Malignant Brother/65   Brother    Family history of Arthritis Father/   Father   Family history of cancer Mother/   Mother   Family history of heart disease Father/   Father   Family history of diabetes mellitus Brother/   Brother         Reproductive History  Menstrual   Menopause Status: Postmenopausal Age Menopause: 35   Pregnancy Summary   Total Pregnancies: 3 Full Term: 3 Premature: 0   Ab Induced: 0 Ab Spontaneous: 0 Ectopics: 0   Multiples: 0 Livin         Social History  Finding Status Start/Stop Quantity Notes   Alcohol Never --/-- --  does not drink   Retired --  --/-- --  --    Tobacco Former  3-4 --          Immunizations  NameDate Admin Mfg Trade Name Lot Number Route Inj VIS Given VIS Publication   [id number]/ SKB Fluarix, quadrivalent, preservative free [id number] [facility] 10/13/2015 2012   Comments: Pt tolerated well. Left office in stable condition.   [id number]/25/2018 MSD PNEUMOVAX 23 [id number]  LD 2018   Comments: patient tolerated well, left office in stable condition.   Prevnar 1304 WAL PREVNAR 13 [id number]  RD 2017   Comments: Patient tolerated well, left office in stable condition.         Review of Systems  · Constitutional  o Denies  o : fever, fatigue  · HENT  o Denies  o : headaches, nasal congestion, sore throat  · Cardiovascular  o Denies  o : lower extremity edema, chest pain, palpitations  · Respiratory  o Denies  o : shortness of breath, cough  · Gastrointestinal  o Denies  o :  "nausea, vomiting, diarrhea, constipation, abdominal pain  · Integument  o Admits  o : itching, hair changes, skin dryness, tenderness to scalp  o Denies  o : rash, pigment changes, new skin lesions  · Neurologic  o Denies  o : dizziness      Vitals  Date Time BP Position Site L\R Cuff Size HR RR TEMP (F) WT  HT  BMI kg/m2 BSA m2 O2 Sat HC       02/18/2020 10:22 /82 Sitting    68 - R  98.2 187lbs 6oz 5'  2\" 34.27 1.93 95 %    07/14/2020 09:08 /86 Sitting    63 - R 18 97.9 192lbs 0oz 5'  2\" 35.12 1.95 95 %    09/11/2020 01:36 /72 Sitting    76 - R 15 98.5 187lbs 0oz 5'  2\" 34.2 1.93 96 %          Physical Examination  · Constitutional  o Appearance  o : no acute distress, well-nourished  · Head and Face  o Head  o :   § Inspection  § : atraumatic, normocephalic  · Eyes  o Eyes  o : extraocular movements intact, no scleral icterus, no conjunctival injection  · Ears, Nose, Mouth and Throat  o Ears  o :   § External Ears  § : normal  o Nose  o :   § Intranasal Exam  § : nares patent  o Oral Cavity  o :   § Oral Mucosa  § : moist mucous membranes  · Respiratory  o Respiratory Effort  o : breathing comfortably, symmetric chest rise  o Auscultation of Lungs  o : clear to asculatation bilaterally, no wheezes, rales, or rhonchii  · Cardiovascular  o Heart  o :   § Auscultation of Heart  § : regular rate and rhythm, no murmurs, rubs, or gallops  o Peripheral Vascular System  o :   § Extremities  § : no edema  · Skin and Subcutaneous Tissue  o General Inspection  o : balding present on top of scalp, hair loss present on top of scalp, no lesions present  · Neurologic  o Mental Status Examination  o :   § Orientation  § : grossly oriented to person, place and time  o Gait and Station  o :   § Gait Screening  § : normal gait  · Psychiatric  o General  o : normal mood and affect              Assessment  · Hair loss     704.00/L65.9  Discussed symptoms with patient. Encouraged patient to use Ketoconazole shampoo " 2x/week 3 days apart from each other. Referred patient to Dermatology for a second opinion. Patient understood and agreed with plan.  · Scalp itch     698.9/L29.9  · Seborrheic dermatitis     690.10/L21.9  see above      Plan  · Orders  o ACO-39: Current medications updated and reviewed () - - 09/11/2020  o DERMATOLOGY CONSULTATION ([facility]) - 704.00/L65.9, 698.9/L29.9, 690.10/L21.9 - 09/11/2020   Patient wants second opinion. Not Dr. Robb.  · Medications  o ketoconazole 2 % topical shampoo   SIG: apply shampoo by topical route twice weekly with at least 3 days between each shampooing   DISP: (1) 120 ml bottle with 0 refills  Prescribed on 09/11/2020     o Medications have been Reconciled  o Transition of Care or Provider Policy  · Instructions  o Take all medications as prescribed/directed.  o Patient was educated/instructed on their diagnosis, treatment and medications prior to discharge from the clinic today.  o Call the office with any concerns or questions.  · Disposition  o Call or Return if symptoms worsen or persist.  o Keep follow up appt as scheduled  o Prescriptions sent electronically  o Care Transition  o HEATHER Sent            Electronically Signed by: Ashleigh Villeda PA-C -Author on September 11, 2020 02:36:14 PM

## 2021-05-13 NOTE — PROGRESS NOTES
"   Progress Note      Patient Name: Osiris Vila   Patient ID: [id number]   Sex: Female   YOB: 1931    Primary Care Provider: Ave Raya MD   Referring Provider: Ave Raya MD    Visit Date: July 14, 2020    Provider: SUNDAR Francisco   Location: [facility]   Location Address: [address] [place] [address], [address]  [place], [place]  [id number]   Location Phone: [phone]          Chief Complaint  · \"we have concerns about memory\"  · \"I would like to have her ears checked\"  · \"refill for hydrocodone, written script\"      History Of Present Illness  Osiris Vila is a 88 year old /White female who presents for evaluation and treatment of:      Follow up    Chronic management    Memory issue continue with house hold thing, left the shower running one day. Caring for animals is becoming an issue due to feeding, water and taking outside. Son is worried about her taking care of house hold things. Lives with daughter. Patient reports she feels safe at home alone and will let someone know if she gets more confused.     Establishing care with Neurology tomorrow.     Depression- taking sertraline as prescribed, continues to feel sad and wants to continue getting out and about. Feels like a burden to her family. Feels like she lost her independence.     Mole that came up on the left breast, denies tenderness, lump, lesion, bleeding, discharge.     Denies cp, shortness of breath, swelling.       Past Medical History  Disease Name Date Onset Notes   Actinic keratosis 09/20/2016 lesions frozen today   Allergic rhinitis --  --    Arthritis --  --    Back pain --  --    Colitis, Ulcerative --  --    Deafness --  --    GERD (gastroesophageal reflux disease) 09/20/2016 omeprazole for 4-6 months old, discussed risks   Hearing aid worn --  --    Hearing loss --  --    Hernia --  --    Hiatal hernia --  --    Hypertension --  --    Iron deficiency --  --    Lumbago 06/23/2015 " chronic, will refill meds when she is out of the meds she gets from [facility] will need UDS at that time completed consent today   Seborrheic dermatitis 06/23/2015 will continue treatment with ketakonazole shampoo and have her follow up with derm   Sinus Trouble; unspecified --  --    SOB (shortness of breath) --  --    Vitamin D Deficiency --  --          Past Surgical History  Procedure Name Date Notes   Bladder Surg. 1989 --    Cholecystectomy 1973 --    Colonoscopy 2013 --    EGD 2013 --    Hernia (Hiatal) --  --    Hysterectomy 1986 --    Knee replacement, left 2012 --    Sinus Surgery 1993, 1998, 2000 --          Medication List  Name Date Started Instructions   8 Hour Pain Reliever 650 mg oral tablet extended release  --    Allergy Injections  As Directed   Calcium Citrate + D 315-200 mg-unit oral tablet  --    chlorthalidone 25 mg oral tablet 07/14/2020 TAKE 1 TABLET BY MOUTH ONCE DAILY IN THE MORNING   Eliquis 5 mg oral tablet 07/13/2020 TAKE 1 TABLET BY MOUTH TWICE DAILY   hydrocodone-acetaminophen 5-325 mg oral tablet 07/14/2020 take 1 tablet by oral route every 6 hours as needed for pain   lisinopril 10 mg oral tablet 07/14/2020 take 1 tablet (10 mg) by oral route once daily   multivitamin oral tablet  take 1 tablet by oral route daily   mupirocin 2 % topical ointment 11/18/2019 apply a small amount to the affected area by topical route 3 times per day   Omega-3 350 mg-235 mg- 90 mg-597 mg oral capsule,delayed release(DR/EC)  --    sertraline 100 mg oral tablet 02/18/2020 take 1 tablet (100 mg) by oral route once daily   Vitamin B-12 oral  --    Vitamin C 500 mg oral tablet  --          Allergy List  Allergen Name Date Reaction Notes   atenolol --  --  --    bisoprolol fumarate --  --  --    clonidine --  --  --    diltiazem HCl --  --  --    doxycycline hyclate --  --  --    erythromycin --  --  --    fosinopril --  --  --    Keflex --  --  --    lisinopril --  --  --    Macrobid --  --  --    Molds --  --   --    oxybutynin --  --  --    TETRACYCLINES --  --  --    triamterene --  --  --    trimethoprim --  --  --    verapamil --  --  --    yeast --  --  --          Family Medical History  Disease Name Relative/Age Notes   Pancreatic Neoplasm, Malignant Brother/65   Brother          Reproductive History  Menstrual   Menopause Status: Postmenopausal Age Menopause: 35   Pregnancy Summary   Total Pregnancies: 3 Full Term: 3 Premature: 0   Ab Induced: 0 Ab Spontaneous: 0 Ectopics: 0   Multiples: 0 Livin         Social History  Finding Status Start/Stop Quantity Notes   Alcohol Current some day --/-- not much --    Tobacco Former  3-4 --          Immunizations  NameDate Admin Mfg Trade Name Lot Number Route Inj VIS Given VIS Publication   [id number]/ SKB Fluarix, quadrivalent, preservative free 2A2KX [facility] 10/13/2015 2012   Comments: Pt tolerated well. Left office in stable condition.   Newffeols3076/25/2018 MSD PNEUMOVAX 23 [id number]  LD 2018   Comments: patient tolerated well, left office in stable condition.   Prevnar 1304 [facility] PREVNAR 13 [id number]  RD 2017   Comments: Patient tolerated well, left office in stable condition.         Review of Systems  · Constitutional  o Denies  o : fever, fatigue, weight loss, weight gain  · Breasts  o Admits  o : skin lesion   o Denies  o : lumps, tenderness, swelling, nipple discharge  · Cardiovascular  o Denies  o : lower extremity edema, claudication, chest pressure, palpitations  · Respiratory  o Denies  o : shortness of breath, wheezing, cough, hemoptysis, dyspnea on exertion  · Gastrointestinal  o Denies  o : nausea, vomiting, diarrhea, constipation, abdominal pain  · Integument  o Admits  o : pigmentation changes, new skin lesions  · Neurologic  o Admits  o : memory difficulties  o Denies  o : altered mental status, muscular weakness, incoordination, speech difficulties, dizziness, loss of  "balance  · Psychiatric  o Admits  o : anxiety, depression  o Denies  o : suicidal ideation, homicidal ideation      Vitals  Date Time BP Position Site L\R Cuff Size HR RR TEMP (F) WT  HT  BMI kg/m2 BSA m2 O2 Sat HC       11/18/2019 11:44 /80 Sitting    65 - R  97.8 185lbs 6oz 5'  2\" 33.91 1.92 94 %    02/18/2020 10:22 /82 Sitting    68 - R  98.2 187lbs 6oz 5'  2\" 34.27 1.93 95 %    07/14/2020 09:08 /86 Sitting    63 - R 18 97.9 192lbs 0oz 5'  2\" 35.12 1.95 95 %          Physical Examination  · Constitutional  o Appearance  o : no acute distress, well-nourished  · Head and Face  o Head  o :   § Inspection  § : atraumatic, normocephalic  · Eyes  o Eyes  o : extraocular movements intact, no scleral icterus, no conjunctival injection  · Ears, Nose, Mouth and Throat  o Ears  o :   § External Ears  § : normal  § Otoscopic Examination  § : tympanic membrane appearance within normal limits bilaterally  o Nose  o :   § Intranasal Exam  § : nares patent  o Oral Cavity  o :   § Oral Mucosa  § : moist mucous membranes  o Throat  o :   § Oropharynx  § : no inflammation or lesions present, tonsils within normal limits  · Respiratory  o Respiratory Effort  o : breathing comfortably, symmetric chest rise  o Auscultation of Lungs  o : clear to asculatation bilaterally, no wheezes, rales, or rhonchii  · Cardiovascular  o Heart  o :   § Auscultation of Heart  § : regular rate and rhythm, no murmurs, rubs, or gallops  o Peripheral Vascular System  o :   § Extremities  § : no edema  · Gastrointestinal  o Abdominal Examination  o :   § Abdomen  § : bowel sounds present, non-distended, non-tender  · Lymphatic  o Neck  o : no lymphadenopathy present  o Supraclavicular Nodes  o : no supraclavicular nodes  · Skin and Subcutaneous Tissue  o General Inspection  o : no areas of discoloration, skin turgor normal, new skin lesion to the right breast that is crusted in appearance at 9 o'clock  · Neurologic  o Mental Status " Examination  o :   § Orientation  § : grossly oriented to person, place and time  o Gait and Station  o :   § Gait Screening  § : normal gait  · Psychiatric  o General  o : normal mood and affect          Assessment  · Seborrheic dermatitis     690.10/L21.9  will continue treatment with ketakonazole shampoo and have her follow up with derm  · Allergic rhinitis     477.9/J30.9  · Hypertension     401.9/I10  well controlled   · Memory changes     780.93/R41.3  follow up with neuro tomorrow. Educated on safety risk and concerns.   · Screening for lipid disorders     V77.91/Z13.220  labs ordered  · Skin lesion     709.9/L98.9    Problems Reconciled  Plan  · Orders  o CBC with Auto Diff [facility] (50822) - 477.9/J30.9, 401.9/I10, 780.93/R41.3 - 07/14/2020  o CMP [facility] (57183) - 477.9/J30.9, 401.9/I10, 780.93/R41.3 - 07/14/2020  o Lipid Panel [facility] (40458) - 401.9/I10, 780.93/R41.3, V77.91/Z13.220 - 07/14/2020   ate this morning.   o Thyroid Profile (95314, 74757, THYII) - 477.9/J30.9, 401.9/I10, 780.93/R41.3 - 07/14/2020   ate this morning, uvaldo   o ACO-39: Current medications updated and reviewed () - - 07/14/2020  o DERMATOLOGY CONSULTATION (DERMA) - 690.10/L21.9 - 07/14/2020   woman please  · Medications  o lisinopril 10 mg oral tablet   SIG: take 1 tablet (10 mg) by oral route once daily   DISP: (90) tablets with 0 refills  Refilled on 07/14/2020     o Medications have been Reconciled  o Transition of Care or Provider Policy  · Instructions  o Patient was educated/instructed on their diagnosis, treatment and medications prior to discharge from the clinic today.  o Call the office with any concerns or questions.  o Bring all medicines with their bottles to each office visit.  o Electronically Identified Patient Education Materials Provided Electronically  · Disposition  o Call or Return if symptoms worsen or persist.  o Meds sent to pharmacy  o Paper script given in office  o 3 month follow up  o Dr. Raya patient  o Care  Transition  o HEATHER Sent            Electronically Signed by: Daisy Gillette APRN -Author on July 14, 2020 11:25:31 AM

## 2021-05-13 NOTE — PROGRESS NOTES
"   Progress Note      Patient Name: Osiris Vila   Patient ID: [id number]   Sex: Female   YOB: 1931    Primary Care Provider: Ave Raya MD   Referring Provider: Ave Raya MD    Visit Date: October 23, 2020    Provider: Ave Raya MD   Location: [facility]   Location Address: [address] [place] [address]  [id number]   Location Phone: [phone]          Chief Complaint  · \"She has vascular dementia\"  · \"She has incontinence issues, with bowels as well\"  · \"Her memory is getting worse\"  · \"She has started to hallucinate\"  · \"She gets very angry when she doesn't get her way\"  · \"She will sit and scratch her head for hours\"  · \"Stays up until 2 or 3 am\"      History Of Present Illness  Osiris Vila is a 88 year old /White female who presents for evaluation and treatment of:      pt has been seen by Dr Robb and he states that there isn't anything wrong with her scalp  however she states continually itchy and she feels like she constantly has flaking and problems there    Unfortunately recently her dementia has gotten significantly worse  pt has left the toaster and oven on because she forgets to shut it off  she has left the shower running twice and has flooded the bathroom and basement  She never remembers things after people have conversations with her  Her children feel that she is becoming very unsafe at home  They have been looking for placement for her but have been struggling to find it as because Ms. Vila is able to give herself a bath, get dressed, feed herself etc. they are often told that she will not qualify       Past Medical History  Disease Name Date Onset Notes   Actinic keratosis 09/20/2016 lesions frozen today   Allergic rhinitis --  --    Arthritis --  --    Back pain --  --    Colitis, Ulcerative --  --    Deafness --  --    GERD (gastroesophageal reflux disease) 09/20/2016 omeprazole for 4-6 months old, " discussed risks   Hearing aid worn --  --    Hearing loss --  --    Hernia --  --    Hiatal hernia --  --    Hypertension --  --    Hypertension, Benign Essential --  --    Iron deficiency --  --    Leg swelling --  --    Lumbago 06/23/2015 chronic, will refill meds when she is out of the meds she gets from [facility] will need UDS at that time completed consent today   Seborrheic dermatitis 06/23/2015 will continue treatment with ketakonazole shampoo and have her follow up with derm   Sinus Trouble; unspecified --  --    SOB (shortness of breath) --  --    Vitamin D Deficiency --  --          Past Surgical History  Procedure Name Date Notes   Bladder Surg. 1989 --    Cataract surgery --  --    Cholecystectomy 1973 --    Colonoscopy 2013 --    EGD 2013 --    Hernia (Hiatal) --  --    Hysterectomy 1986 --    Knee replacement, left 2012 --    Sinus Surgery 1993, 1998, 2000 --          Medication List  Name Date Started Instructions   4 Wheel Walker with Seat 1 Walker 07/27/2020 Use to help with mobility as needed   8 Hour Pain Reliever 650 mg oral tablet extended release  --    Allergy Injections  As Directed   Calcium Citrate + D 315-200 mg-unit oral tablet  --    chlorthalidone 25 mg oral tablet 08/03/2020 TAKE ONE TABLET BY MOUTH EVERY MORNING   donepezil 10 mg oral tablet 09/16/2020 take 1 tablet (10 mg) by oral route once daily in the evening for 30 days   Eliquis 5 mg oral tablet 09/14/2020 Take 1 tablet by mouth twice daily   hydrocodone-acetaminophen 5-325 mg oral tablet 09/18/2020 take 1 tablet by oral route daily   ketoconazole 2 % topical shampoo 09/11/2020 apply shampoo by topical route twice weekly with at least 3 days between each shampooing   lisinopril 10 mg oral tablet 08/24/2020 take 1 tablet (10 mg) by oral route once daily   multivitamin oral tablet  take 1 tablet by oral route daily   mupirocin 2 % topical ointment 11/18/2019 apply a small amount to the affected area by topical route 3 times per day    Namenda Titration Juanito 5-10 mg oral tablets,dose pack 2020 take by oral route per package directions for 30 days   Omega-3 350 mg-235 mg- 90 mg-597 mg oral capsule,delayed release(DR/EC)  --    sertraline 100 mg oral tablet 2020 Take 1 tablet by mouth once daily   Vitamin B-12 oral  --    Vitamin C 500 mg oral tablet  --          Allergy List  Allergen Name Date Reaction Notes   atenolol --  --  --    bisoprolol fumarate --  --  --    clonidine --  --  --    diltiazem HCl --  --  --    doxycycline hyclate --  --  --    erythromycin --  --  --    fosinopril --  --  --    Keflex --  --  --    lisinopril --  --  --    Macrobid --  --  --    Molds --  --  --    oxybutynin --  --  --    TETRACYCLINES --  --  --    triamterene --  --  --    trimethoprim --  --  --    verapamil --  --  --    yeast --  --  --          Family Medical History  Disease Name Relative/Age Notes   Pancreatic Neoplasm, Malignant Brother/65   Brother    Family history of Arthritis Father/   Father   Family history of cancer Mother/   Mother   Family history of heart disease Father/   Father   Family history of diabetes mellitus Brother/   Brother         Reproductive History  Menstrual   Menopause Status: Postmenopausal Age Menopause: 35   Pregnancy Summary   Total Pregnancies: 3 Full Term: 3 Premature: 0   Ab Induced: 0 Ab Spontaneous: 0 Ectopics: 0   Multiples: 0 Livin         Social History  Finding Status Start/Stop Quantity Notes   Alcohol Never --/-- --  does not drink   Retired --  --/-- --  --    Tobacco Former  3-4 --          Immunizations  NameDate Admin Mfg Trade Name Lot Number Route Inj VIS Given VIS Publication   Pjvoccvdo39/ SKB Fluarix, quadrivalent, preservative free 2A2KX IM LD 10/13/2015 2012   Comments: Pt tolerated well. Left office in stable condition.   Tegejslpm4894/25/2018 MSD PNEUMOVAX 23 [id number] IM LD 2018   Comments: patient tolerated well, left office in stable  "condition.   Prevnar 1304/18/2017 [facility] PREVNAR 13 [id number] IM RD 04/18/2017 11/05/2015   Comments: Patient tolerated well, left office in stable condition.         Review of Systems  · Constitutional  o Denies  o : fever, fatigue, weight loss, weight gain  · Cardiovascular  o Denies  o : lower extremity edema, claudication, chest pressure, palpitations  · Respiratory  o Denies  o : shortness of breath, wheezing, frequent cough, hemoptysis, dyspnea on exertion  · Gastrointestinal  o Denies  o : nausea, vomiting, diarrhea, constipation, abdominal pain      Vitals  Date Time BP Position Site L\R Cuff Size HR RR TEMP (F) WT  HT  BMI kg/m2 BSA m2 O2 Sat FR L/min FiO2 HC       09/11/2020 01:36 /72 Sitting    76 - R 15 98.5 187lbs 0oz 5'  2\" 34.2 1.93 96 %      09/16/2020 09:54 /80 Sitting    81 - R  97.1 184lbs 0oz 5'  2\" 33.65 1.91       10/23/2020 09:02 /82 Sitting    75 - R  97.3 191lbs 0oz 5'  2\" 34.93 1.95 95 %  21%          Physical Examination  · Constitutional  o Appearance  o : no acute distress, well-nourished  · Head and Face  o Head  o :   § Inspection  § : atraumatic, normocephalic  · Eyes  o Eyes  o : extraocular movements intact, no scleral icterus, no conjunctival injection  · Ears, Nose, Mouth and Throat  o Ears  o :   § External Ears  § : normal  o Nose  o :   § Intranasal Exam  § : nares patent  o Oral Cavity  o :   § Oral Mucosa  § : moist mucous membranes  · Respiratory  o Respiratory Effort  o : breathing comfortably, symmetric chest rise  o Auscultation of Lungs  o : clear to asculatation bilaterally, no wheezes, rales, or rhonchii  · Cardiovascular  o Heart  o :   § Auscultation of Heart  § : regular rate and rhythm, no murmurs, rubs, or gallops  o Peripheral Vascular System  o :   § Extremities  § : no edema  · Skin and Subcutaneous Tissue  o General Inspection  o : She scratches at anterior scalp several times, no seborrheic dermatoses seen, short hairs in area of " scratching  · Neurologic  o Mental Status Examination  o :   § Orientation  § : grossly oriented to person, place and time  o Gait and Station  o :   § Gait Screening  § : normal gait  · Psychiatric  o General  o : Tearful at times while we were discussing her care          Assessment  · Lumbago     724.2/M54.5  Discussed trying not to use this if at all possible unless she is in significant pain and not taking it is a daily medicine and we will see how she does with this  · Seborrheic dermatitis     690.10/L21.9  Lesions seen today I believe that her dermatologic issue is actually more of a psychologic issue with scratching  Discussed with her that she should try not to scratch if possible    We will give hydroxyzine to help with itching  · Need for influenza vaccination     V04.81/Z23  · Dementia     294.20/F03.90  Doing significantly worse  I agree that she is a danger to herself at this point and potentially others as well as she is leaving stove and water on  Will get them in touch with Kathe in our clinic to try to work on finding a memory care unit placement for her as I believe this would be best for all involved          Problems Reconciled  Plan  · Orders  o Immunization Admin Fee (Single) ([facility]) (48599) - V04.81/Z23 - 10/23/2020  o Fluzone High Dose Flu Vaccine (15222) - V04.81/Z23 - 10/23/2020   Vaccine - Influenza; Dose: .7; Site: Right Deltoid; Route: Intramuscular; Date: 10/23/2020 10:41:00; Exp: 06/30/2021; Lot: [id number]; Mfg: sanofi pasteur; TradeName: FLUZONE-HIGH DOSE; Administered By: Kim Jarvis RTR; Comment: patient tolerated well, left office in stable condition  o ACO-39: Current medications updated and reviewed (1159M, ) - - 10/23/2020  o ACO-14: Influenza immunization administered or previously received [facility] () - V04.81/Z23 - 10/23/2020  · Medications  o hydroxyzine HCl 25 mg oral tablet   SIG: one tab po TID prn itching   DISP: (90) Tablet with 1 refills  Adjusted on 10/23/2020      o hydrocodone-acetaminophen 5-325 mg oral tablet   SIG: take 1 tablet by oral route daily   DISP: (30) Tablet with 0 refills  Adjusted on 10/23/2020     o Medications have been Reconciled  o Transition of Care or Provider Policy  · Instructions  o Patient was educated/instructed on their diagnosis, treatment and medications prior to discharge from the clinic today.  · Disposition  o 6 Week Follow Up            Electronically Signed by: Ave Raya MD -Author on October 25, 2020 06:03:01 PM

## 2021-05-13 NOTE — PROGRESS NOTES
Progress Note      Patient Name: Osiris Vila   Patient ID: [id number]   Sex: Female   YOB: 1931    Primary Care Provider: Ave Raya MD   Referring Provider: Ave Raya MD    Visit Date: September 16, 2020    Provider: SUNDAR Horta   Location: [facility]   Location Address: [facility]  [address] [place] [address]  [id number]   Location Phone: [phone]          Chief Complaint  · Follow Up Exam     Patient is here for a 2 month follow up for stroke, states medication is not working. She is here to discuss doppler and labs.       History Of Present Illness  Osiris Vila is a 88 year old /White female who presents today to [facility] today referred by Ave Raya MD for evaluation of memory. States that memory loss was first noticed about 3 years ago. Family has noticed any memory difficulty. Is having difficulty with short term memory. Endorses difficulty remembering appointments. Endorses difficulty handling financial affairs, such as balancing checkbook and paying bills timely. Does not feel as if she could learn a new tool, appliance or gadget. Endorses decreased interest in hobbies or activities. Endorses depression. Denies homicidal ideation and suicidal ideation. Endorses anxiety. Does experience repetitive questioning. Denies difficulty with judgment, bad financial decisions, and impulsivity. Son states that she will leave things out on the counter and forget that they're there. They are concerned about her ability to take care of herself. She no longer uses the stove. Lives with daughter but daughter works during the day.   Does not live alone. Does not drive.   Denies family history of Alzheimer's Disease.   Osiris Vila is a 88 year old /White female who presents today to [facility] today for a follow up exam. Granddaughter states she's continuing to experience memory loss. Has done well  with donepezil, but they do not feel she's had much improvement with memory. Denies side effects. Denies recurrent stroke like symptoms. Discussed normal carotid doppler. Reviewed labs, B12 and homocysteine normal.      MRI Brain:   There is mild prominence of the cerebral sulci and fissures indicating cortical atrophy.  The   ventricles and basal cisterns are well maintained and normal.  There is an area of encephalomalacia   in the left occipital-parietal lobe consistent with an old infarct.  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  There   are abnormal signal changes in the periventricular white matter tracts and subcortical white matter   consistent with chronic microvascular ischemia.  There are also superimposed tiny old lacunar   infarcts noted within the periventricular white matter and basal ganglia.  There is no restricted   diffusion to indicate acute ischemia.  There is no abnormal contrast enhancement.  There is no   acute hemorrhage, midline shift, or suspicious extra-axial fluid collections.  There is normal T2   weighted signal seen within the intracranial arteries and the major dural sinuses.  The orbital   contents are normal.  The paranasal and mastoid sinuses are clear.    Carotid Doppler: Normal     MoCA 15/30       Past Medical History  Actinic keratosis; Allergic rhinitis; Arthritis; Back pain; Colitis, Ulcerative; Deafness; GERD (gastroesophageal reflux disease); Hearing aid worn; Hearing loss; Hernia; Hiatal hernia; Hypertension; Hypertension, Benign Essential; Iron deficiency; Leg swelling; Lumbago; Seborrheic dermatitis; Sinus Trouble; unspecified; SOB (shortness of breath); Vitamin D Deficiency         Past Surgical History  Bladder Surg.; Cataract surgery; Cholecystectomy; Colonoscopy; EGD; Hernia (Hiatal); Hysterectomy;  Knee replacement, left; Sinus Surgery         Medication List  4 Wheel Walker with Seat 1 Walker; 8 Hour Pain Reliever 650 mg oral tablet extended release; Allergy Injections; Calcium Citrate + D 315-200 mg-unit oral tablet; chlorthalidone 25 mg oral tablet; donepezil 10 mg oral tablet; Eliquis 5 mg oral tablet; hydrocodone-acetaminophen 5-325 mg oral tablet; ketoconazole 2 % topical shampoo; lisinopril 10 mg oral tablet; multivitamin oral tablet; mupirocin 2 % topical ointment; Omega-3 350 mg-235 mg- 90 mg-597 mg oral capsule,delayed release(DR/EC); sertraline 100 mg oral tablet; Vitamin B-12 oral; Vitamin C 500 mg oral tablet         Allergy List  atenolol; bisoprolol fumarate; clonidine; diltiazem HCl; doxycycline hyclate; erythromycin; fosinopril; Keflex; lisinopril; Macrobid; Molds; oxybutynin; TETRACYCLINES; triamterene; trimethoprim; verapamil; yeast       Allergies Reconciled  Family Medical History  Pancreatic Neoplasm, Malignant; Family history of Arthritis; Family history of cancer; Family history of heart disease; Family history of diabetes mellitus         Reproductive History   3 Para 3 0 0 0 & Postmenopausal       Social History  Alcohol (Never); Retired; Tobacco (Former)         Immunizations  Name Date Admin   Influenza    Connubtph45    Prevnar 13          Review of Systems  · Constitutional  o Admits  o : fatigue, weight gain  o Denies  o : chills, excessive sweating, fever, sycope/passing out, weight loss  · Eyes  o Denies  o : changes in vision, blurry vision, double vision  · HENT  o Admits  o : loss of hearing, ear aches, seasonal allergies  o Denies  o : ringing in the ears, sore throat, nasal congestion, sinus pain, nose bleeds  · Cardiovascular  o Denies  o : blood clots, swollen legs, anemia, easy burising or bleeding, transfusions  · Respiratory  o Denies  o : shortness of breath, dry cough, productive cough, pneumonia, COPD  · Gastrointestinal  o Denies  o : difficulty swallowing,  "reflux  · Genitourinary  o Admits  o : incontinence  · Neurologic  o Denies  o : headache, seizure, stroke, tremor, loss of balance, falls, dizziness/vertigo, difficulty with sleep, numbness/tingling/paresthesia , difficulty with coordination, difficulty with dexterity, weakness  · Musculoskeletal  o Admits  o : joint pain  o Denies  o : neck stiffness/pain, swollen lymph nodes, muscle aches, weakness, spasms, sciatica, pain radiating in arm, pain radiating in leg, low back pain  · Endocrine  o Denies  o : diabetes, thyroid disorder  · Psychiatric  o Denies  o : anxiety, depression      Vitals  Date Time BP Position Site L\R Cuff Size HR RR TEMP (F) WT  HT  BMI kg/m2 BSA m2 O2 Sat HC       09/16/2020 09:54 /80 Sitting    81 - R  97.1 184lbs 0oz 5'  2\" 33.65 1.91           Physical Examination  · Constitutional  o Appearance  o : well-nourished, well groomed, in no apparent distress  · Eyes  o Pupils and Irises  o : Pupils equal, round, and reactive to light and accommodation bilaterally  · Respiratory  o Auscultation of Lungs  o : Lungs were clear to ascultation bilaterally. No wheezes, rhonchi or rales were appreciated.  · Cardiovascular  o Heart  o :   § Auscultation of Heart  § : Regular rate and rhythm, no murmurs, gallops or rubs were appreciated.  o Peripheral Vascular System  o :   § Extremities  § : No peripheral edema was appreciated  · Musculoskeletal  o General  o : Normal bulk and normal tone throughout. 5/5 motor strength throughout and symmetric.   · Neurologic  o Cranial Nerves  o : Pupils are equal, round and reactive to light. Extraocular movements are intact. Visual fields are full. Fundoscopic examination reveals sharp disc bilaterally. Sensation in the V1-V3 distribution is intact and symmetric. Muscles of mastication are strong and symmetric. Muscles of facial expression are strong and symmetric. Hearing is intact. Palatal raise is intact and symmetric. Uvula is midline. Shoulder shrug is " strong. Tongue protrudes in the midline.  o Motor Examination  o :   § RUE Strength  § : strength normal  § LUE Strength  § : strength normal  § RLE Strength  § : strength normal  § LLE Strength  § : strength normal  o Reflexes  o : 2+ reflexes throughout and symmetric. Negative Lin. Negative Babinski.   o Sensation  o : Intact sensation to light touch, pinprick, vibration and proprioception throughout.  o Gait and Station  o :   § Gait Screening  § : Antalgic gait  o Cerebellar Function  o : intact finger to nose and heel to shin. Rapid alternating movements are intact in the upper and lower extremities.   o Coordination  o : Intact finger to nose and heel to shin. Rapid alternating movements are intact in the upper and lower extremities.          Assessment  · Dementia     294.20/F03.90  Significant vascular disease and old stroke on MRI brain. Likely a vascular dementia. Is not able to live alone, recommend 24 hour caregiver or long term care facility. Will refer to the [employer] for resources and education for her family. Will continue Aricept and will start Namenda for memory.   · Stroke     434.91/I63.9  Continue Eliquis for secondary stroke risk reduction. Discussed signs and symptoms of stroke and the importance of going to the ER immediately at the onset of those symptoms.       Plan  · Medications  o Namenda Titration Juanito 5-10 mg oral tablets,dose pack   SIG: take by oral route per package directions for 30 days   DISP: (1) 49 ct dose-pack with 0 refills  Prescribed on 09/16/2020     o donepezil 10 mg oral tablet   SIG: take 1 tablet (10 mg) by oral route once daily in the evening for 30 days   DISP: (30) tablets with 2 refills  Adjusted on 09/16/2020     o Medications have been Reconciled  o Transition of Care or Provider Policy  · Instructions  o Call or Return if symptoms worsen or persist.   o Follow up in 3 months.  o Electronically Identified Patient Education Materials Provided  Electronically            Electronically Signed by: Meri Tracey APRN -Author on September 18, 2020 02:33:37 PM

## 2021-05-13 NOTE — PROGRESS NOTES
Progress Note      Patient Name: Osiris Vila   Patient ID: [id number]   Sex: Female   YOB: 1931    Primary Care Provider: Ave Raya MD   Referring Provider: Ave Raya MD    Visit Date: December 4, 2020    Provider: Ave Raya MD   Location: [facility]   Location Address: [address] [place] [address]  [id number]   Location Phone: [phone]          Chief Complaint  · f/u  · scalp itching and burning  · i have a spot on the left side of my face i think is a fungus      History Of Present Illness  Osiris Vila is a 89 year old /White female who presents for evaluation and treatment of:      Chronic issues.    She is still living with her daughter and her daughter's   They have been looking for places but have not found anything  No new changes or concerns  Still has itching on her scalp       Past Medical History  Disease Name Date Onset Notes   Actinic keratosis 09/20/2016 lesions frozen today   Allergic rhinitis --  --    Arthritis --  --    Back pain --  --    Colitis, Ulcerative --  --    Deafness --  --    GERD (gastroesophageal reflux disease) 09/20/2016 omeprazole for 4-6 months old, discussed risks   Hearing aid worn --  --    Hearing loss --  --    Hernia --  --    Hiatal hernia --  --    Hypertension --  --    Hypertension, Benign Essential --  --    Iron deficiency --  --    Leg swelling --  --    Lumbago 06/23/2015 chronic, will refill meds when she is out of the meds she gets from [facility] will need UDS at that time completed consent today   Seborrheic dermatitis 06/23/2015 will continue treatment with ketakonazole shampoo and have her follow up with derm   Sinus Trouble; unspecified --  --    SOB (shortness of breath) --  --    Vascular dementia --  --    Vitamin D Deficiency --  --          Past Surgical History  Procedure Name Date Notes   Bladder Surg. 1989 --    Cataract surgery --  --    Cholecystectomy  1973 --    Colonoscopy 2013 --    EGD 2013 --    Hernia (Hiatal) --  --    Hysterectomy 1986 --    Knee replacement, left 2012 --    Sinus Surgery 1993, 1998, 2000 --          Medication List  Name Date Started Instructions   4 Wheel Walker with Seat 1 Walker 07/27/2020 Use to help with mobility as needed   Allergy Injections  As Directed   Calcium Citrate + D 315-200 mg-unit oral tablet  --    chlorthalidone 25 mg oral tablet 11/17/2020 TAKE ONE TABLET BY MOUTH EVERY MORNING   Claritin 10 mg oral tablet  take 1 tablet (10 mg) by oral route once daily   donepezil 10 mg oral tablet 12/17/2020 take 1 tablet (10 mg) by oral route once daily in the evening for 30 days   Eliquis 5 mg oral tablet 09/14/2020 Take 1 tablet by mouth twice daily   hydrocodone-acetaminophen 5-325 mg oral tablet 10/23/2020 take 1 tablet by oral route daily   hydroxyzine HCl 25 mg oral tablet 10/23/2020 one tab po TID prn itching   lisinopril 10 mg oral tablet 12/02/2020 Take 1 tablet by mouth once daily   multivitamin oral tablet  take 1 tablet by oral route daily   Namenda 10 mg oral tablet 12/17/2020 take 1 tablet (10 mg) by oral route 2 times per day for 30 days   Rollating Walker 12/04/2020 dx: instability, weakness   sertraline 100 mg oral tablet 12/04/2020 Take 1 tablet by mouth once daily   Vitamin C oral  take 1 by oral route daily         Allergy List  Allergen Name Date Reaction Notes   atenolol --  --  --    bisoprolol fumarate --  --  --    clonidine --  --  --    diltiazem HCl --  --  --    doxycycline hyclate --  --  --    erythromycin --  --  --    fosinopril --  --  --    Keflex --  --  --    lisinopril --  --  --    Macrobid --  --  --    Molds --  --  --    oxybutynin --  --  --    TETRACYCLINES --  --  --    triamterene --  --  --    trimethoprim --  --  --    verapamil --  --  --    yeast --  --  --        Allergies Reconciled  Family Medical History  Disease Name Relative/Age Notes   Pancreatic Neoplasm, Malignant  "Brother/65   Brother    Family history of Arthritis Father/   Father   Family history of cancer Mother/   Mother   Family history of heart disease Father/   Father   Family history of diabetes mellitus Brother/   Brother         Reproductive History  Menstrual   Menopause Status: Postmenopausal Age Menopause: 35   Pregnancy Summary   Total Pregnancies: 3 Full Term: 3 Premature: 0   Ab Induced: 0 Ab Spontaneous: 0 Ectopics: 0   Multiples: 0 Livin         Social History  Finding Status Start/Stop Quantity Notes   Alcohol Never --/-- --  does not drink   Retired --  --/-- --  --    Tobacco Former  3-4 --          Immunizations  NameDate Admin Mfg Trade Name Lot Number Route Inj VIS Given VIS Publication   [id number]/ PMC FLUZONE-HIGH DOSE [id number] IM RD 10/23/2020 2012   Comments: patient tolerated well, left office in stable condition   Kalvooevw1357/25/2018 MSD PNEUMOVAX 23 [id number] IM LD 2018   Comments: patient tolerated well, left office in stable condition.   Prevnar 1304 WAL PREVNAR 13 [id number] IM RD 2017   Comments: Patient tolerated well, left office in stable condition.         Review of Systems  · Constitutional  o Denies  o : fever, fatigue, weight loss, weight gain  · Cardiovascular  o Denies  o : lower extremity edema, claudication, chest pressure, palpitations  · Respiratory  o Denies  o : shortness of breath, wheezing, frequent cough, hemoptysis, dyspnea on exertion  · Gastrointestinal  o Denies  o : nausea, vomiting, diarrhea, constipation, abdominal pain      Vitals  Date Time BP Position Site L\R Cuff Size HR RR TEMP (F) WT  HT  BMI kg/m2 BSA m2 O2 Sat FR L/min FiO2 HC       2020 09:54 /80 Sitting    81 - R  97.1 184lbs 0oz 5'  2\" 33.65 1.91       10/23/2020 09:02 /82 Sitting    75 - R  97.3 191lbs 0oz 5'  2\" 34.93 1.95 95 %  21%    2020 10:21 /78 Sitting    63 - R 16 98.1 194lbs 2oz 5'  2\" 35.51 1.96 97 " %  21%          Physical Examination  · Constitutional  o Appearance  o : no acute distress, well-nourished  · Head and Face  o Head  o :   § Inspection  § : atraumatic, normocephalic  · Eyes  o Eyes  o : extraocular movements intact, no scleral icterus, no conjunctival injection  · Respiratory  o Respiratory Effort  o : breathing comfortably, symmetric chest rise  o Auscultation of Lungs  o : clear to asculatation bilaterally, no wheezes, rales, or rhonchii  · Cardiovascular  o Heart  o :   § Auscultation of Heart  § : regular rate and rhythm, no murmurs, rubs, or gallops  o Peripheral Vascular System  o :   § Extremities  § : no edema  · Neurologic  o Mental Status Examination  o :   § Orientation  § : grossly oriented to person, place and time  o Gait and Station  o :   § Gait Screening  § : normal gait  · Psychiatric  o General  o : normal mood and affect          Assessment  · Seborrheic dermatitis     690.10/L21.9  Continue hydroxyzine  · Dementia     294.20/F03.90  Still unsure of etiology  Continue to work with her and her family    Problems Reconciled  Plan  · Orders  o ACO-39: Current medications updated and reviewed (, 1159F) - - 12/04/2020  · Medications  o Medications have been Reconciled  o Transition of Care or Provider Policy  · Instructions  o Patient was educated/instructed on their diagnosis, treatment and medications prior to discharge from the clinic today.            Electronically Signed by: Ave Raya MD -Author on December 26, 2020 12:16:04 AM

## 2021-05-14 VITALS
DIASTOLIC BLOOD PRESSURE: 72 MMHG | HEART RATE: 76 BPM | WEIGHT: 187 LBS | BODY MASS INDEX: 34.41 KG/M2 | TEMPERATURE: 98.5 F | SYSTOLIC BLOOD PRESSURE: 138 MMHG | HEIGHT: 62 IN | RESPIRATION RATE: 15 BRPM | OXYGEN SATURATION: 96 %

## 2021-05-14 VITALS
HEART RATE: 75 BPM | TEMPERATURE: 96.8 F | DIASTOLIC BLOOD PRESSURE: 59 MMHG | SYSTOLIC BLOOD PRESSURE: 130 MMHG | WEIGHT: 192.37 LBS | RESPIRATION RATE: 73 BRPM | HEIGHT: 62 IN | OXYGEN SATURATION: 99 % | BODY MASS INDEX: 35.4 KG/M2

## 2021-05-14 VITALS
TEMPERATURE: 97.1 F | BODY MASS INDEX: 33.86 KG/M2 | HEART RATE: 81 BPM | SYSTOLIC BLOOD PRESSURE: 105 MMHG | DIASTOLIC BLOOD PRESSURE: 80 MMHG | WEIGHT: 184 LBS | HEIGHT: 62 IN

## 2021-05-14 VITALS
WEIGHT: 190 LBS | DIASTOLIC BLOOD PRESSURE: 80 MMHG | OXYGEN SATURATION: 94 % | HEIGHT: 62 IN | BODY MASS INDEX: 34.96 KG/M2 | HEART RATE: 82 BPM | TEMPERATURE: 97 F | SYSTOLIC BLOOD PRESSURE: 130 MMHG

## 2021-05-14 VITALS
WEIGHT: 192.5 LBS | SYSTOLIC BLOOD PRESSURE: 184 MMHG | BODY MASS INDEX: 35.43 KG/M2 | TEMPERATURE: 96.5 F | HEART RATE: 66 BPM | HEIGHT: 62 IN | DIASTOLIC BLOOD PRESSURE: 64 MMHG

## 2021-05-14 VITALS
HEART RATE: 75 BPM | OXYGEN SATURATION: 95 % | BODY MASS INDEX: 35.15 KG/M2 | WEIGHT: 191 LBS | HEIGHT: 62 IN | TEMPERATURE: 97.3 F | DIASTOLIC BLOOD PRESSURE: 82 MMHG | SYSTOLIC BLOOD PRESSURE: 124 MMHG

## 2021-05-14 VITALS
HEIGHT: 62 IN | HEART RATE: 63 BPM | TEMPERATURE: 98.1 F | SYSTOLIC BLOOD PRESSURE: 140 MMHG | RESPIRATION RATE: 16 BRPM | DIASTOLIC BLOOD PRESSURE: 78 MMHG | OXYGEN SATURATION: 97 % | BODY MASS INDEX: 35.72 KG/M2 | WEIGHT: 194.12 LBS

## 2021-05-14 VITALS
TEMPERATURE: 97.4 F | BODY MASS INDEX: 35.88 KG/M2 | WEIGHT: 195 LBS | DIASTOLIC BLOOD PRESSURE: 88 MMHG | SYSTOLIC BLOOD PRESSURE: 134 MMHG | HEIGHT: 62 IN | OXYGEN SATURATION: 93 % | HEART RATE: 72 BPM

## 2021-05-14 NOTE — PROGRESS NOTES
"   Progress Note      Patient Name: Osiris Vila   Patient ID: [id number]   Sex: Female   YOB: 1931    Primary Care Provider: Ave Raya MD   Referring Provider: Ave Raya MD    Visit Date: April 26, 2021    Provider: SUNDAR WESTON   Location: [facility]   Location Address: [address] [place] [address]  [id number]   Location Phone: [phone]          Chief Complaint  · Acute  · \"constant diarrhea, few months\"  · \"pt's daughter stated she has been crying all the time\"      History Of Present Illness  Osiris Vila is a 89 year old /White female who presents for evaluation and treatment of:      Patient presents the office today for persistent diarrhea for several years per the daughter.  The patient does have dementia and reports that it has only been present for about a week.  The patient does live with her daughter currently.  Patient's last colonoscopy was in 2013 performed by Dr. Fox which displayed collagenous colitis.  Patient does not take NSAIDs or drink alcohol.  Patient reports she has about 3 bowel movements a day that are loose in texture.  Patient denies melena or hematochezia, abdominal pain, fever, nausea, vomiting.    Patient's daughter reports that she has been crying often and has been easily agitated.  Patient does have a history of dementia.  She is currently on Zoloft 100 mg but feels its not effective.  The daughter reports that she is crying often and sobbing uncontrollably.       Past Medical History  Disease Name Date Onset Notes   Actinic keratosis 09/20/2016 lesions frozen today   Allergic rhinitis --  --    Arthritis --  --    Back pain --  --    Colitis, Ulcerative --  --    Deafness --  --    GERD (gastroesophageal reflux disease) 09/20/2016 omeprazole for 4-6 months old, discussed risks   Hearing aid worn --  --    Hearing loss --  --    Hernia --  --    Hiatal hernia --  --    Hypertension --  -- "    Hypertension, Benign Essential --  --    Iron deficiency --  --    Leg swelling --  --    Lumbago 06/23/2015 chronic, will refill meds when she is out of the meds she gets from [facility] will need UDS at that time completed consent today   Seborrheic dermatitis 06/23/2015 will continue treatment with ketakonazole shampoo and have her follow up with derm   Sinus Trouble; unspecified --  --    SOB (shortness of breath) --  --    Vascular dementia --  --    Vitamin D Deficiency --  --          Past Surgical History  Procedure Name Date Notes   Bladder Surg. 1989 --    Cataract surgery --  --    Cholecystectomy 1973 --    Colonoscopy 2013 --    EGD 2013 --    Hernia (Hiatal) --  --    Hysterectomy 1986 --    Knee replacement, left 2012 --    Sinus Surgery 1993, 1998, 2000 --          Medication List  Name Date Started Instructions   Allergy Injections  As Directed   Calcium Citrate + D 315-200 mg-unit oral tablet  --    chlorthalidone 25 mg oral tablet 11/17/2020 TAKE ONE TABLET BY MOUTH EVERY MORNING   Claritin 10 mg oral tablet  take 1 tablet (10 mg) by oral route once daily   clobetasol 0.05 % scalp solution 03/05/2021 apply to the affected scalp area by topical route 2 times per day in the morning and evening   donepezil 10 mg oral tablet 03/15/2021 take 1 tablet (10 mg) by oral route once daily in the evening for 30 days   Eliquis 5 mg oral tablet 03/24/2021 Take 1 tablet by mouth twice daily   hydrocodone-acetaminophen 5-325 mg oral tablet 04/12/2021 take 1 tablet by oral route daily   lisinopril 10 mg oral tablet 12/02/2020 Take 1 tablet by mouth once daily   multivitamin oral tablet  take 1 tablet by oral route daily   Seroquel 25 mg oral tablet 03/15/2021 take 1 tablet by oral route once a day (at bedtime) for 30 days   Vitamin C oral  take 1 by oral route daily   Zoloft 50 mg oral tablet 04/26/2021 take 1 tablet (50 mg) by oral route once daily         Allergy List  Allergen Name Date Reaction Notes   atenolol  --  --  --    bisoprolol fumarate --  --  --    clonidine --  --  --    diltiazem HCl --  --  --    doxycycline hyclate --  --  --    erythromycin --  --  --    fosinopril --  --  --    Keflex --  --  --    lisinopril --  --  --    Macrobid --  --  --    Molds --  --  --    oxybutynin --  --  --    TETRACYCLINES --  --  --    triamterene --  --  --    trimethoprim --  --  --    verapamil --  --  --    yeast --  --  --        Allergies Reconciled  Family Medical History  Disease Name Relative/Age Notes   Pancreatic Neoplasm, Malignant Brother/65   Brother    Family history of Arthritis Father/   Father   Family history of cancer Mother/   Mother   Family history of heart disease Father/   Father   Family history of diabetes mellitus Brother/   Brother         Reproductive History  Menstrual   Menopause Status: Postmenopausal Age Menopause: 35   Pregnancy Summary   Total Pregnancies: 3 Full Term: 3 Premature: 0   Ab Induced: 0 Ab Spontaneous: 0 Ectopics: 0   Multiples: 0 Livin         Social History  Finding Status Start/Stop Quantity Notes   Alcohol Never --/-- --  does not drink   Retired --  --/-- --  --    Tobacco Former  3-4 03/15/2021 -          Immunizations  NameDate Admin Mfg Trade Name Lot Number Route Inj VIS Given VIS Publication   [id number]/ [facility] FLUZONE-HIGH DOSE [id number]  RD 10/23/2020 2012   Comments: patient tolerated well, left office in stable condition   [id number]/25/2018 MSD PNEUMOVAX 23 [id number]  LD 2018   Comments: patient tolerated well, left office in stable condition.   Prevnar 1304 [employer] PREVNAR 13 [id number] IM RD 2017   Comments: Patient tolerated well, left office in stable condition.         Review of Systems  · Constitutional  o Denies  o : fever, fatigue, weight loss, weight gain  · Cardiovascular  o Denies  o : lower extremity edema, claudication, chest pressure, palpitations  · Respiratory  o Denies  o :  "shortness of breath, wheezing, cough, hemoptysis, dyspnea on exertion  · Gastrointestinal  o Admits  o : diarrhea  o Denies  o : nausea, vomiting, constipation, abdominal pain  · Psychiatric  o Admits  o : depression  o Denies  o : anxiety, suicidal ideation, homicidal ideation      Vitals  Date Time BP Position Site L\R Cuff Size HR RR TEMP (F) WT  HT  BMI kg/m2 BSA m2 O2 Sat FR L/min FiO2 HC       04/26/2021 04:03 /80 Sitting    82 - R  97 190lbs 0oz 5'  2\" 34.75 1.94 94 %  21%          Physical Examination  · Constitutional  o Appearance  o : no acute distress, well-nourished  · Head and Face  o Head  o :   § Inspection  § : atraumatic, normocephalic  · Eyes  o Eyes  o : extraocular movements intact, no scleral icterus, no conjunctival injection  · Ears, Nose, Mouth and Throat  o Ears  o :   § External Ears  § : normal  o Nose  o :   § Intranasal Exam  § : nares patent  o Oral Cavity  o :   § Oral Mucosa  § : moist mucous membranes  · Respiratory  o Respiratory Effort  o : breathing comfortably, symmetric chest rise  o Auscultation of Lungs  o : clear to asculatation bilaterally, no wheezes, rales, or rhonchii  · Cardiovascular  o Heart  o :   § Auscultation of Heart  § : regular rate and rhythm, no murmurs, rubs, or gallops  o Peripheral Vascular System  o :   § Extremities  § : no edema  · Gastrointestinal  o Abdominal Examination  o :   § Abdomen  § : bowel sounds present, non-distended, non-tender  · Skin and Subcutaneous Tissue  o General Inspection  o : no lesions present, no areas of discoloration, skin turgor normal  · Neurologic  o Mental Status Examination  o :   § Orientation  § : grossly oriented to person, place and time  o Gait and Station  o :   § Gait Screening  § : normal gait  · Psychiatric  o General  o : normal mood and affect          Assessment  · Depression     311/F32.9  I have increased her Zoloft from 100 mg to 150 mg at this time. She will follow up with Dr. Raya as planned at " the beginning of June.  · Diarrhea     787.91/R19.7  I have ordered stool studies to be collected and educated the patient and family how to collect the stool studies. I have also ordered colestipol 1 g to be taken twice daily. I have educated the patient and family to take this at a different time from her other medications because it could alter absorption. This will be helpful even if this is a collagenous colitis. Patient would like to defer future colonoscopy due to age which I feel is reasonable.      Plan  · Orders  o ACO-39: Current medications updated and reviewed (, 1159F) - - 04/26/2021  o Stool culture (17300, 18850) - - 04/26/2021  o Lactoferrin (Fecal) Qualitative (31123) - - 04/26/2021  o C difficile Toxigenic Assay (PCR) [facility] (35338) - - 04/26/2021  o Pancreatic elastase stool (26020) - - 04/26/2021  o Stool Giardia and Cryptosporidium Enzyme Immunoassay [facility] (90359, 65954) - - 04/26/2021  o Fecal Fat, Qualitative (19363) - - 04/26/2021  o Fecal Occult Blood Diagnostic (Send to Lab) [facility] (09161) - - 04/26/2021  · Medications  o colestipol 1 gram oral tablet   SIG: take 1 tablet (1 gram) by oral route 2 times per day swallowing whole with any liquid. Do not crush, chew and/or divide.   DISP: (60) Tablet with 0 refills  Prescribed on 04/26/2021     o Zoloft 50 mg oral tablet   SIG: take 1 tablet (50 mg) by oral route once daily   DISP: (30) Tablet with 2 refills  Adjusted on 04/26/2021     o Medications have been Reconciled  o Transition of Care or Provider Policy  · Instructions  o Discussed the need for therapy, either with a certified counselor, psychologist, and/or family . If no improvement is noted or worsening of their condition, return to office or ER. But also discussed with patient that if they are non-responsive to the type of medication they may need to see a psychiatrist for further evaluation and management.  o Rest. Increase Fluids.  o Patient was educated/instructed on their  diagnosis, treatment and medications prior to discharge from the clinic today.  · Disposition  o Call or Return if symptoms worsen or persist.  o Meds sent to pharmacy  o 2 month follow up            Electronically Signed by: SUNDAR WESTON -Author on April 26, 2021 05:33:17 PM

## 2021-05-14 NOTE — PROGRESS NOTES
"   Progress Note      Patient Name: Osiris Vila   Patient ID: [id number]   Sex: Female   YOB: 1931    Primary Care Provider: Ave Raya MD   Referring Provider: Ave Raya MD    Visit Date: March 5, 2021    Provider: Ave Raya MD   Location: [facility]   Location Address: [address] [place] [address], [address]  [id number]   Location Phone: [phone]          Chief Complaint  · \"FOLLOW UP\"  · \"itchy scalp\"      History Of Present Illness  Osiris Vila is a 89 year old /White female who presents for evaluation and treatment of:      pt daughter sent a note today stating:  -took her off hydroxyzine, it didn't help itching and \"made her crazy\"  -more problems walking  -severe incontinence  -memory is bad    daughter is working with Idalia Mejía at [facility] to get home services started.           Past Medical History  Disease Name Date Onset Notes   Actinic keratosis 09/20/2016 lesions frozen today   Allergic rhinitis --  --    Arthritis --  --    Back pain --  --    Colitis, Ulcerative --  --    Deafness --  --    GERD (gastroesophageal reflux disease) 09/20/2016 omeprazole for 4-6 months old, discussed risks   Hearing aid worn --  --    Hearing loss --  --    Hernia --  --    Hiatal hernia --  --    Hypertension --  --    Hypertension, Benign Essential --  --    Iron deficiency --  --    Leg swelling --  --    Lumbago 06/23/2015 chronic, will refill meds when she is out of the meds she gets from [facility] will need UDS at that time completed consent today   Seborrheic dermatitis 06/23/2015 will continue treatment with ketakonazole shampoo and have her follow up with derm   Sinus Trouble; unspecified --  --    SOB (shortness of breath) --  --    Vascular dementia --  --    Vitamin D Deficiency --  --          Past Surgical History  Procedure Name Date Notes   Bladder Surg. 1989 --    Cataract surgery --  --    Cholecystectomy 1973 --  "   Colonoscopy  --    EGD  --    Hernia (Hiatal) --  --    Hysterectomy  --    Knee replacement, left 2012 --    Sinus Surgery , ,  --          Medication List  Name Date Started Instructions   Allergy Injections  As Directed   Calcium Citrate + D 315-200 mg-unit oral tablet  --    chlorthalidone 25 mg oral tablet 2020 TAKE ONE TABLET BY MOUTH EVERY MORNING   Claritin 10 mg oral tablet  take 1 tablet (10 mg) by oral route once daily   donepezil 10 mg oral tablet 03/15/2021 take 1 tablet (10 mg) by oral route once daily in the evening for 30 days   Eliquis 5 mg oral tablet 2021 Take 1 tablet by mouth twice daily   hydrocodone-acetaminophen 5-325 mg oral tablet 2021 take 1 tablet by oral route daily   lisinopril 10 mg oral tablet 2020 Take 1 tablet by mouth once daily   multivitamin oral tablet  take 1 tablet by oral route daily   Seroquel 25 mg oral tablet 03/15/2021 take 1 tablet by oral route once a day (at bedtime) for 30 days   sertraline 100 mg oral tablet 2020 Take 1 tablet by mouth once daily   Vitamin C oral  take 1 by oral route daily         Allergy List  Allergen Name Date Reaction Notes   atenolol --  --  --    bisoprolol fumarate --  --  --    clonidine --  --  --    diltiazem HCl --  --  --    doxycycline hyclate --  --  --    erythromycin --  --  --    fosinopril --  --  --    Keflex --  --  --    lisinopril --  --  --    Macrobid --  --  --    Molds --  --  --    oxybutynin --  --  --    TETRACYCLINES --  --  --    triamterene --  --  --    trimethoprim --  --  --    verapamil --  --  --    yeast --  --  --          Family Medical History  Disease Name Relative/Age Notes   Pancreatic Neoplasm, Malignant Brother/65   Brother    Family history of Arthritis Father/   Father   Family history of cancer Mother/   Mother   Family history of heart disease Father/   Father   Family history of diabetes mellitus Brother/   Brother         Reproductive  "History  Menstrual   Menopause Status: Postmenopausal Age Menopause: 35   Pregnancy Summary   Total Pregnancies: 3 Full Term: 3 Premature: 0   Ab Induced: 0 Ab Spontaneous: 0 Ectopics: 0   Multiples: 0 Livin         Social History  Finding Status Start/Stop Quantity Notes   Alcohol Never --/-- --  does not drink   Retired --  --/-- --  --    Tobacco Former  3-4 03/15/2021 -          Immunizations  NameDate Admin Mfg Trade Name Lot Number Route Inj VIS Given VIS Publication   [id number]/ [employer] FLUZONE-HIGH DOSE [id number] IM RD 10/23/2020 2012   Comments: patient tolerated well, left office in stable condition   [id number]/25/2018 [employer] PNEUMOVAX 23 [id number] IM LD 2018   Comments: patient tolerated well, left office in stable condition.   Prevnar 1304 [employer] PREVNAR 13 [id number] IM RD 2017   Comments: Patient tolerated well, left office in stable condition.         Vitals  Date Time BP Position Site L\R Cuff Size HR RR TEMP (F) WT  HT  BMI kg/m2 BSA m2 O2 Sat FR L/min FiO2 HC       2020 10:21 /78 Sitting    63 - R 16 98.1 194lbs 2oz 5'  2\" 35.51 1.96 97 %  21%    2020 09:59 /64 Sitting    66 - R  96.5 192lbs 8oz 5'  2\" 35.21 1.95       2021 09:26 /88 Sitting    72 - R  97.4 194lbs 16oz 5'  2\" 35.67 1.97 93 %  21%          Physical Examination  · Constitutional  o Appearance  o : no acute distress, well-nourished  · Head and Face  o Head  o :   § Inspection  § : atraumatic, normocephalic  · Eyes  o Eyes  o : extraocular movements intact, no scleral icterus, no conjunctival injection  · Respiratory  o Respiratory Effort  o : breathing comfortably, symmetric chest rise  o Auscultation of Lungs  o : clear to asculatation bilaterally, no wheezes, rales, or rhonchii  · Cardiovascular  o Heart  o :   § Auscultation of Heart  § : regular rate and rhythm, no murmurs, rubs, or gallops  o Peripheral Vascular System  o : "   § Extremities  § : no edema  · Skin and Subcutaneous Tissue  o General Inspection  o : Continues to have bald spots with sparse hair growth on the front of her head where she states she has itching  · Neurologic  o Mental Status Examination  o :   § Orientation  § : grossly oriented to person, place and time  o Gait and Station  o :   § Gait Screening  § : normal gait  · Psychiatric  o General  o : normal mood and affect          Results  · In-Office Procedures  o Lab procedure  § IOP - Urinalysis without Microscopy (Clinitek) [facility] ([id number])   § Color Ur: Yellow   § Clarity Ur: Clear   § Glucose Ur Ql Strip: Negative   § Bilirub Ur Ql Strip: Negative   § Ketones Ur Ql Strip: Negative   § Sp Gr Ur Qn: 1.020   § Hgb Ur Ql Strip: Negative   § pH Ur-LsCnc: 5.0   § Prot Ur Ql Strip: Negative   § Urobilinogen Ur Strip-mCnc: 0.2 E.U./dL   § Nitrite Ur Ql Strip: Negative   § WBC Est Ur Ql Strip: Negative       Assessment  · GERD (gastroesophageal reflux disease)     530.81/K21.9  · Lumbago     724.2/M54.5  Discussed continuing pain meds however trying to stop them if she can  · Seborrheic dermatitis     690.10/L21.9  Will try a small amount of clobetasol and see if this helps  · Hypertension     401.9/I10  · Anxiety disorder     300.00/F41.9  Stable continue current meds  · Screening for lipid disorders     V77.91/Z13.220  · Dementia     294.20/F03.90  Getting severe  Will work with family on home health versus placement    Problems Reconciled  Plan  · Orders  o CBC with Auto Diff [facility] ([id number]) - 530.81/K21.9, 401.9/I10, 300.00/F41.9 - 03/05/2021  o CMP [facility] ([id number]) - 530.81/K21.9, 401.9/I10, 300.00/F41.9 - 03/05/2021  o Lipid Panel [facility] ([id number]) - V77.91/Z13.220 - 03/05/2021  o TSH [facility] ([id number]) - 530.81/K21.9, 401.9/I10, 300.00/F41.9 - 03/05/2021  o ACO-39: Current medications updated and reviewed (, 1159F) - - 03/05/2021  · Medications  o clobetasol 0.05 % scalp solution   SIG: apply to the affected scalp area by topical  route 2 times per day in the morning and evening   DISP: (90) Milliliter with 0 refills  Prescribed on 03/05/2021     o hydroxyzine HCl 25 mg oral tablet   SIG: one tab po TID prn itching   DISP: (90) Tablet with 1 refills  Discontinued on 03/05/2021     o Medications have been Reconciled  o Transition of Care or Provider Policy  · Instructions  o Patient was educated/instructed on their diagnosis, treatment and medications prior to discharge from the clinic today.  · Disposition  o 3 Month Follow Up            Electronically Signed by: Ave Raya MD -Author on March 28, 2021 02:49:31 PM
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
## 2021-05-14 NOTE — PROGRESS NOTES
Progress Note      Patient Name: Osiris Vila   Patient ID: [id number]   Sex: Female   YOB: 1931    Primary Care Provider: Ave Raya MD   Referring Provider: Ave Raya MD    Visit Date: March 15, 2021    Provider: SUNDAR Horta   Location: [facility]   Location Address: [address] [place] [address]  [id number]   Location Phone: [phone]          Chief Complaint     2 month f/u       History Of Present Illness  Osiris Vila is a 89 year old /White female who presents today to [facility] today referred by Ave Raya MD for evaluation of memory. States that memory loss was first noticed about 3 years ago. Family has noticed any memory difficulty. Is having difficulty with short term memory. Endorses difficulty remembering appointments. Endorses difficulty handling financial affairs, such as balancing checkbook and paying bills timely. Does not feel as if she could learn a new tool, appliance or gadget. Endorses decreased interest in hobbies or activities. Endorses depression. Denies homicidal ideation and suicidal ideation. Endorses anxiety. Does experience repetitive questioning. Denies difficulty with judgment, bad financial decisions, and impulsivity.   Does not live alone. Does not drive.   DENIES/ENDORSES family history of Alzheimer's Disease. Son states that she will leave things out on the counter and forget that they're there. They are concerned about her ability to take care of herself. She no longer uses the stove. Lives with daughter but daughter works during the day.   Osiris Vila is a 89 year old /White female who presents today to [facility] today for a follow up exam. Daughter is primary historian. States that the patient is continuing to be left alone during the day. States that she is working on a waiver through [employer] that would provide caregiving services. States  she's been sleeping well. Daughter endorses irritability and mood swings.          MRI Brain:   There is mild prominence of the cerebral sulci and fissures indicating cortical atrophy.  The   ventricles and basal cisterns are well maintained and normal.  There is an area of encephalomalacia   in the left occipital-parietal lobe consistent with an old infarct.  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  There   are abnormal signal changes in the periventricular white matter tracts and subcortical white matter   consistent with chronic microvascular ischemia.  There are also superimposed tiny old lacunar   infarcts noted within the periventricular white matter and basal ganglia.  There is no restricted   diffusion to indicate acute ischemia.  There is no abnormal contrast enhancement.  There is no   acute hemorrhage, midline shift, or suspicious extra-axial fluid collections.  There is normal T2   weighted signal seen within the intracranial arteries and the major dural sinuses.  The orbital   contents are normal.  The paranasal and mastoid sinuses are clear.    MoCA 15/30       Past Medical History  Actinic keratosis; Allergic rhinitis; Arthritis; Back pain; Colitis, Ulcerative; Deafness; GERD (gastroesophageal reflux disease); Hearing aid worn; Hearing loss; Hernia; Hiatal hernia; Hypertension; Hypertension, Benign Essential; Iron deficiency; Leg swelling; Lumbago; Seborrheic dermatitis; Sinus Trouble; unspecified; SOB (shortness of breath); Vascular dementia; Vitamin D Deficiency         Past Surgical History  Bladder Surg.; Cataract surgery; Cholecystectomy; Colonoscopy; EGD; Hernia (Hiatal); Hysterectomy; Knee replacement, left; Sinus Surgery         Medication List  Allergy Injections; Calcium Citrate + D 315-200 mg-unit oral tablet; chlorthalidone  25 mg oral tablet; Claritin 10 mg oral tablet; clobetasol 0.05 % scalp solution; donepezil 10 mg oral tablet; Eliquis 5 mg oral tablet; hydrocodone-acetaminophen 5-325 mg oral tablet; lisinopril 10 mg oral tablet; multivitamin oral tablet; sertraline 100 mg oral tablet; Vitamin C oral         Allergy List  atenolol; bisoprolol fumarate; clonidine; diltiazem HCl; doxycycline hyclate; erythromycin; fosinopril; Keflex; lisinopril; Macrobid; Molds; oxybutynin; TETRACYCLINES; triamterene; trimethoprim; verapamil; yeast       Allergies Reconciled  Family Medical History  Pancreatic Neoplasm, Malignant; Family history of Arthritis; Family history of cancer; Family history of heart disease; Family history of diabetes mellitus         Reproductive History   3 Para 3 0 0 0 & Postmenopausal       Social History  Alcohol (Never); Retired; Tobacco (Former)         Immunizations  Name Date Admin   Influenza 10/23/2020   Influenza 10/13/2015   [id number] 2018   Prevnar 13 2017         Review of Systems  · Constitutional  o Admits  o : fatigue  o Denies  o : chills, excessive sweating, fever, sycope/passing out, weight gain, weight loss  · Eyes  o Denies  o : changes in vision, blurry vision, double vision  · HENT  o Admits  o : loss of hearing, nasal congestion  o Denies  o : ringing in the ears, ear aches, sore throat, sinus pain, nose bleeds, seasonal allergies  · Cardiovascular  o Denies  o : blood clots, swollen legs, anemia, easy burising or bleeding, transfusions  · Respiratory  o Denies  o : shortness of breath, dry cough, productive cough, pneumonia, COPD  · Gastrointestinal  o Denies  o : difficulty swallowing, reflux  · Genitourinary  o Denies  o : incontinence  · Neurologic  o Admits  o : falls, difficulty with sleep, numbness/tingling/paresthesia , difficulty with coordination, weakness  o Denies  o : headache, seizure, stroke, tremor, loss of balance, dizziness/vertigo, difficulty with  "dexterity  · Musculoskeletal  o Admits  o : muscle aches, joint pain, weakness, spasms, pain radiating in leg, low back pain  o Denies  o : neck stiffness/pain, swollen lymph nodes, sciatica, pain radiating in arm  · Endocrine  o Denies  o : diabetes, thyroid disorder  · Psychiatric  o Admits  o : anxiety, depression      Vitals  Date Time BP Position Site L\R Cuff Size HR RR TEMP (F) WT  HT  BMI kg/m2 BSA m2 O2 Sat FR L/min FiO2        03/15/2021 03:36 /59 Sitting    75 - R 73 96.8 192lbs 6oz 5'  2.5\" 34.62 1.96 99 %            Physical Examination  · Constitutional  o Appearance  o : well-nourished, well groomed, in no apparent distress  · Eyes  o Pupils and Irises  o : Pupils equal, round, and reactive to light and accommodation bilaterally  · Respiratory  o Auscultation of Lungs  o : Lungs were clear to ascultation bilaterally. No wheezes, rhonchi or rales were appreciated.  · Cardiovascular  o Heart  o :   § Auscultation of Heart  § : Regular rate and rhythm, no murmurs, gallops or rubs were appreciated.  o Peripheral Vascular System  o :   § Extremities  § : No peripheral edema was appreciated  · Musculoskeletal  o General  o : Normal bulk and normal tone throughout. 5/5 motor strength throughout and symmetric.   · Neurologic  o Mental Status Examination  o :   § Memory  § : memory impaired   o Cranial Nerves  o : Pupils are equal, round and reactive to light. Extraocular movements are intact. Visual fields are full. Fundoscopic examination reveals sharp disc bilaterally. Sensation in the V1-V3 distribution is intact and symmetric. Muscles of mastication are strong and symmetric. Muscles of facial expression are strong and symmetric. Hearing is intact. Palatal raise is intact and symmetric. Uvula is midline. Shoulder shrug is strong. Tongue protrudes in the midline.  o Motor Examination  o :   § RUE Strength  § : strength normal  § LUE Strength  § : strength normal  § RLE Strength  § : strength " normal  § LLE Strength  § : strength normal  o Reflexes  o : 2+ reflexes throughout and symmetric. Negative Lin. Negative Babinski.   o Sensation  o : Intact sensation to light touch, pinprick, vibration and proprioception throughout.  o Gait and Station  o :   § Gait Screening  § : Antalgic gait  o Cerebellar Function  o : intact finger to nose and heel to shin. Rapid alternating movements are intact in the upper and lower extremities.   o Coordination  o : Intact finger to nose and heel to shin. Rapid alternating movements are intact in the upper and lower extremities.          Assessment  · Dementia     294.20/F03.90  Indecently reviewed MRI brain, reviewed last note from Dr. Raya,  consult. Significant vascular disease and old stroke on MRI brain. Likely a vascular dementia. Continue Aricept and Namenda. Will start Seroquel for behavioral disturbances. Continue to discuss with daughter that she is NOT safe to be left alone and that she needs 24 hour caregiver in the meantime while they get the waiver program approved. It is absolutely not safe for her to be alone.  · Stroke     434.91/I63.9  Continue Eliquis for secondary stroke risk reduction. Discussed signs and symptoms of stroke and the importance of going to the ER immediately at the onset of those symptoms.      Plan  · Medications  o Seroquel 25 mg oral tablet   SIG: take 1 tablet by oral route once a day (at bedtime) for 30 days   DISP: (30) Tablet with 3 refills  Prescribed on 03/15/2021     o donepezil 10 mg oral tablet   SIG: take 1 tablet (10 mg) by oral route once daily in the evening for 30 days   DISP: (30) Tablet with 2 refills  Refilled on 03/15/2021     o Medications have been Reconciled  o Transition of Care or Provider Policy  · Instructions  o f/u 3 months  · Disposition  o Call or Return if symptoms worsen or persist.            Electronically Signed by: SUNDAR Horta -Author on March 18, 2021 03:25:34 PM

## 2021-05-15 VITALS
HEIGHT: 62 IN | RESPIRATION RATE: 16 BRPM | WEIGHT: 192.12 LBS | BODY MASS INDEX: 35.35 KG/M2 | SYSTOLIC BLOOD PRESSURE: 132 MMHG | HEART RATE: 70 BPM | DIASTOLIC BLOOD PRESSURE: 78 MMHG | TEMPERATURE: 97.9 F | OXYGEN SATURATION: 96 %

## 2021-05-15 VITALS
OXYGEN SATURATION: 96 % | TEMPERATURE: 98.1 F | HEIGHT: 62 IN | WEIGHT: 186.12 LBS | HEART RATE: 71 BPM | SYSTOLIC BLOOD PRESSURE: 132 MMHG | DIASTOLIC BLOOD PRESSURE: 86 MMHG | BODY MASS INDEX: 34.25 KG/M2

## 2021-05-15 VITALS
TEMPERATURE: 97.9 F | HEIGHT: 62 IN | DIASTOLIC BLOOD PRESSURE: 86 MMHG | RESPIRATION RATE: 18 BRPM | BODY MASS INDEX: 35.33 KG/M2 | WEIGHT: 192 LBS | SYSTOLIC BLOOD PRESSURE: 142 MMHG | HEART RATE: 63 BPM | OXYGEN SATURATION: 95 %

## 2021-05-15 VITALS
BODY MASS INDEX: 35.35 KG/M2 | SYSTOLIC BLOOD PRESSURE: 180 MMHG | WEIGHT: 192.12 LBS | TEMPERATURE: 96.9 F | HEART RATE: 66 BPM | DIASTOLIC BLOOD PRESSURE: 80 MMHG | HEIGHT: 62 IN

## 2021-05-15 VITALS
SYSTOLIC BLOOD PRESSURE: 134 MMHG | BODY MASS INDEX: 34.11 KG/M2 | TEMPERATURE: 97.8 F | HEIGHT: 62 IN | HEART RATE: 65 BPM | OXYGEN SATURATION: 94 % | WEIGHT: 185.37 LBS | DIASTOLIC BLOOD PRESSURE: 80 MMHG

## 2021-05-15 VITALS
TEMPERATURE: 98.2 F | HEIGHT: 62 IN | WEIGHT: 187.37 LBS | OXYGEN SATURATION: 95 % | BODY MASS INDEX: 34.48 KG/M2 | DIASTOLIC BLOOD PRESSURE: 82 MMHG | HEART RATE: 68 BPM | SYSTOLIC BLOOD PRESSURE: 138 MMHG

## 2021-05-16 VITALS
HEIGHT: 62 IN | BODY MASS INDEX: 36.12 KG/M2 | TEMPERATURE: 98.2 F | SYSTOLIC BLOOD PRESSURE: 141 MMHG | HEART RATE: 64 BPM | WEIGHT: 196.25 LBS | DIASTOLIC BLOOD PRESSURE: 73 MMHG | OXYGEN SATURATION: 95 % | RESPIRATION RATE: 16 BRPM

## 2021-05-16 VITALS
SYSTOLIC BLOOD PRESSURE: 148 MMHG | OXYGEN SATURATION: 92 % | TEMPERATURE: 97.3 F | HEIGHT: 62 IN | BODY MASS INDEX: 35.54 KG/M2 | WEIGHT: 193.12 LBS | DIASTOLIC BLOOD PRESSURE: 78 MMHG | RESPIRATION RATE: 16 BRPM | HEART RATE: 70 BPM

## 2021-05-16 VITALS
WEIGHT: 195.37 LBS | OXYGEN SATURATION: 95 % | DIASTOLIC BLOOD PRESSURE: 94 MMHG | TEMPERATURE: 97.8 F | SYSTOLIC BLOOD PRESSURE: 160 MMHG | HEIGHT: 62 IN | RESPIRATION RATE: 18 BRPM | HEART RATE: 80 BPM | BODY MASS INDEX: 35.95 KG/M2

## 2021-05-16 VITALS
HEIGHT: 62 IN | TEMPERATURE: 98.2 F | HEART RATE: 74 BPM | RESPIRATION RATE: 16 BRPM | WEIGHT: 196.12 LBS | BODY MASS INDEX: 36.09 KG/M2 | DIASTOLIC BLOOD PRESSURE: 88 MMHG | OXYGEN SATURATION: 94 % | SYSTOLIC BLOOD PRESSURE: 130 MMHG

## 2021-06-10 RX ORDER — LISINOPRIL 10 MG/1
TABLET ORAL
Qty: 90 TABLET | Refills: 0 | Status: SHIPPED | OUTPATIENT
Start: 2021-06-10 | End: 2021-09-14 | Stop reason: SDUPTHER

## 2021-06-10 RX ORDER — DONEPEZIL HYDROCHLORIDE 10 MG/1
10 TABLET, FILM COATED ORAL EVERY EVENING
COMMUNITY
Start: 2021-03-16 | End: 2021-07-20 | Stop reason: SDUPTHER

## 2021-06-10 RX ORDER — MONTELUKAST SODIUM 4 MG/1
1 TABLET, CHEWABLE ORAL 2 TIMES DAILY
COMMUNITY
Start: 2021-04-26 | End: 2021-06-21 | Stop reason: SDUPTHER

## 2021-06-10 RX ORDER — QUETIAPINE FUMARATE 25 MG/1
25 TABLET, FILM COATED ORAL
COMMUNITY
Start: 2021-03-16 | End: 2021-07-20 | Stop reason: SDUPTHER

## 2021-06-10 RX ORDER — CLOBETASOL PROPIONATE 0.46 MG/ML
1 SOLUTION TOPICAL DAILY
COMMUNITY
Start: 2021-03-05 | End: 2022-01-28

## 2021-06-10 RX ORDER — CHLORTHALIDONE 25 MG/1
1 TABLET ORAL DAILY
COMMUNITY
Start: 2021-05-26 | End: 2021-08-31 | Stop reason: SDUPTHER

## 2021-06-10 RX ORDER — HYDROCODONE BITARTRATE AND ACETAMINOPHEN 5; 325 MG/1; MG/1
1 TABLET ORAL DAILY
COMMUNITY
Start: 2021-05-17 | End: 2021-06-21 | Stop reason: SDUPTHER

## 2021-06-21 RX ORDER — MONTELUKAST SODIUM 4 MG/1
1 TABLET, CHEWABLE ORAL 2 TIMES DAILY
Qty: 60 TABLET | Refills: 0 | Status: SHIPPED | OUTPATIENT
Start: 2021-06-21 | End: 2021-09-16 | Stop reason: ALTCHOICE

## 2021-06-21 RX ORDER — HYDROCODONE BITARTRATE AND ACETAMINOPHEN 5; 325 MG/1; MG/1
1 TABLET ORAL DAILY
Qty: 30 TABLET | Refills: 0 | Status: SHIPPED | OUTPATIENT
Start: 2021-06-21 | End: 2021-07-21 | Stop reason: SDUPTHER

## 2021-06-21 NOTE — TELEPHONE ENCOUNTER
Caller: Osiris Vila    Relationship: Self    Best call back number: [phone]    Medication needed:   Requested Prescriptions     Pending Prescriptions Disp Refills   • colestipol (COLESTID) 1 g tablet       Sig: Take 1 tablet by mouth 2 (two) times a day.   • HYDROcodone-acetaminophen (NORCO) 5-325 MG per tablet       Sig: Take 1 tablet by mouth Daily.       When do you need the refill by: 06/21/2021    What additional details did the patient provide when requesting the medication: THE PATIENT IS OUT OF THESE MEDICATIONS AND NEEDS A REFILL ASAP.    Does the patient have less than a 3 day supply:  [x] Yes  [] No    What is the patient's preferred pharmacy: [facility] [address] [place] [address] [phone] [facility] [phone] FX

## 2021-07-09 RX ORDER — SERTRALINE HYDROCHLORIDE 100 MG/1
TABLET, FILM COATED ORAL
Qty: 90 TABLET | Refills: 0 | Status: SHIPPED | OUTPATIENT
Start: 2021-07-09 | End: 2021-07-26 | Stop reason: SDUPTHER

## 2021-07-16 ENCOUNTER — OFFICE VISIT (OUTPATIENT)
Dept: INTERNAL MEDICINE | Facility: CLINIC | Age: 86
End: 2021-07-16

## 2021-07-16 VITALS
SYSTOLIC BLOOD PRESSURE: 140 MMHG | RESPIRATION RATE: 16 BRPM | HEIGHT: 62 IN | DIASTOLIC BLOOD PRESSURE: 64 MMHG | HEART RATE: 64 BPM | WEIGHT: 211 LBS | OXYGEN SATURATION: 93 % | BODY MASS INDEX: 38.83 KG/M2 | TEMPERATURE: 97.4 F

## 2021-07-16 DIAGNOSIS — F01.50 VASCULAR DEMENTIA WITHOUT BEHAVIORAL DISTURBANCE (HCC): ICD-10-CM

## 2021-07-16 DIAGNOSIS — R32 URINARY INCONTINENCE, UNSPECIFIED TYPE: ICD-10-CM

## 2021-07-16 DIAGNOSIS — K21.9 GASTROESOPHAGEAL REFLUX DISEASE WITHOUT ESOPHAGITIS: ICD-10-CM

## 2021-07-16 DIAGNOSIS — H91.90 DEAFNESS, UNSPECIFIED LATERALITY: ICD-10-CM

## 2021-07-16 DIAGNOSIS — I10 ESSENTIAL HYPERTENSION: Primary | ICD-10-CM

## 2021-07-16 LAB
ALBUMIN SERPL-MCNC: 4.3 G/DL (ref 3.5–5.2)
ALBUMIN/GLOB SERPL: 1.5 G/DL
ALP SERPL-CCNC: 47 U/L (ref 39–117)
ALT SERPL W P-5'-P-CCNC: 20 U/L (ref 1–33)
ANION GAP SERPL CALCULATED.3IONS-SCNC: 11.7 MMOL/L (ref 5–15)
AST SERPL-CCNC: 29 U/L (ref 1–32)
BASOPHILS # BLD AUTO: 0.06 10*3/MM3 (ref 0–0.2)
BASOPHILS NFR BLD AUTO: 0.8 % (ref 0–1.5)
BILIRUB SERPL-MCNC: 0.6 MG/DL (ref 0–1.2)
BILIRUB UR QL STRIP: NEGATIVE
BUN SERPL-MCNC: 26 MG/DL (ref 8–23)
BUN/CREAT SERPL: 26.3 (ref 7–25)
CALCIUM SPEC-SCNC: 9.6 MG/DL (ref 8.6–10.5)
CHLORIDE SERPL-SCNC: 102 MMOL/L (ref 98–107)
CHOLEST SERPL-MCNC: 209 MG/DL (ref 0–200)
CLARITY UR: CLEAR
CO2 SERPL-SCNC: 27.3 MMOL/L (ref 22–29)
COLOR UR: YELLOW
CREAT SERPL-MCNC: 0.99 MG/DL (ref 0.57–1)
DEPRECATED RDW RBC AUTO: 44.6 FL (ref 37–54)
EOSINOPHIL # BLD AUTO: 0.15 10*3/MM3 (ref 0–0.4)
EOSINOPHIL NFR BLD AUTO: 1.9 % (ref 0.3–6.2)
ERYTHROCYTE [DISTWIDTH] IN BLOOD BY AUTOMATED COUNT: 13.6 % (ref 12.3–15.4)
GFR SERPL CREATININE-BSD FRML MDRD: 53 ML/MIN/1.73
GLOBULIN UR ELPH-MCNC: 2.9 GM/DL
GLUCOSE SERPL-MCNC: 90 MG/DL (ref 65–99)
GLUCOSE UR STRIP-MCNC: NEGATIVE MG/DL
HCT VFR BLD AUTO: 42.2 % (ref 34–46.6)
HDLC SERPL-MCNC: 46 MG/DL (ref 40–60)
HGB BLD-MCNC: 14.1 G/DL (ref 12–15.9)
HGB UR QL STRIP.AUTO: NEGATIVE
IMM GRANULOCYTES # BLD AUTO: 0.03 10*3/MM3 (ref 0–0.05)
IMM GRANULOCYTES NFR BLD AUTO: 0.4 % (ref 0–0.5)
KETONES UR QL STRIP: NEGATIVE
LDLC SERPL CALC-MCNC: 124 MG/DL (ref 0–100)
LDLC/HDLC SERPL: 2.58 {RATIO}
LEUKOCYTE ESTERASE UR QL STRIP.AUTO: NEGATIVE
LYMPHOCYTES # BLD AUTO: 1.54 10*3/MM3 (ref 0.7–3.1)
LYMPHOCYTES NFR BLD AUTO: 19.8 % (ref 19.6–45.3)
MCH RBC QN AUTO: 30.1 PG (ref 26.6–33)
MCHC RBC AUTO-ENTMCNC: 33.4 G/DL (ref 31.5–35.7)
MCV RBC AUTO: 90.2 FL (ref 79–97)
MONOCYTES # BLD AUTO: 0.57 10*3/MM3 (ref 0.1–0.9)
MONOCYTES NFR BLD AUTO: 7.3 % (ref 5–12)
NEUTROPHILS NFR BLD AUTO: 5.43 10*3/MM3 (ref 1.7–7)
NEUTROPHILS NFR BLD AUTO: 69.8 % (ref 42.7–76)
NITRITE UR QL STRIP: NEGATIVE
NRBC BLD AUTO-RTO: 0 /100 WBC (ref 0–0.2)
PH UR STRIP.AUTO: 5.5 [PH] (ref 5–8)
PLATELET # BLD AUTO: 196 10*3/MM3 (ref 140–450)
PMV BLD AUTO: 11.9 FL (ref 6–12)
POTASSIUM SERPL-SCNC: 4.2 MMOL/L (ref 3.5–5.2)
PROT SERPL-MCNC: 7.2 G/DL (ref 6–8.5)
PROT UR QL STRIP: NEGATIVE
RBC # BLD AUTO: 4.68 10*6/MM3 (ref 3.77–5.28)
SODIUM SERPL-SCNC: 141 MMOL/L (ref 136–145)
SP GR UR STRIP: >=1.03 (ref 1–1.03)
TRIGL SERPL-MCNC: 222 MG/DL (ref 0–150)
TSH SERPL DL<=0.05 MIU/L-ACNC: 1.48 UIU/ML (ref 0.27–4.2)
UROBILINOGEN UR QL STRIP: NORMAL
VLDLC SERPL-MCNC: 39 MG/DL (ref 5–40)
WBC # BLD AUTO: 7.78 10*3/MM3 (ref 3.4–10.8)

## 2021-07-16 PROCEDURE — 84443 ASSAY THYROID STIM HORMONE: CPT | Performed by: PHYSICIAN ASSISTANT

## 2021-07-16 PROCEDURE — 80061 LIPID PANEL: CPT | Performed by: PHYSICIAN ASSISTANT

## 2021-07-16 PROCEDURE — 99213 OFFICE O/P EST LOW 20 MIN: CPT | Performed by: PHYSICIAN ASSISTANT

## 2021-07-16 PROCEDURE — 36415 COLL VENOUS BLD VENIPUNCTURE: CPT | Performed by: PHYSICIAN ASSISTANT

## 2021-07-16 PROCEDURE — 80053 COMPREHEN METABOLIC PANEL: CPT | Performed by: PHYSICIAN ASSISTANT

## 2021-07-16 PROCEDURE — 85025 COMPLETE CBC W/AUTO DIFF WBC: CPT | Performed by: PHYSICIAN ASSISTANT

## 2021-07-16 PROCEDURE — 81003 URINALYSIS AUTO W/O SCOPE: CPT | Performed by: PHYSICIAN ASSISTANT

## 2021-07-16 NOTE — PROGRESS NOTES
Chief Complaint  Follow-up    Subjective          Osiris Vila presents to [facility]  Pt here for check up and labs    HTN: denies cp, palpitations, dizziness, ha, sob, syncope    Dementia:  Pt able to recall month, day of the week, and year  Pt able to tell me the president and previous president.    Pt lives with her daughter Ros    Constipation: Pt has bm daily  She has bloating  Denies blood in stool  Denies hard stool or straining.     Denies incontinence, dysuria, frequency  Home aid is with her today and states pt not having accidents at home    Feels angry at times  Denies feeling sad but cries often.  Pt states crying helps her feel better.   Denies si/hi.   Pt states she sleeps well.           Past Medical History:   Diagnosis Date   • Actinic keratosis 09/20/2016    LESIONS FROZEN TODAY   • Allergic rhinitis    • Arthritis    • Back pain    • Colitis, ulcerative (CMS/HCC)    • Condition not found     HERNIA   • Deafness    • GERD (gastroesophageal reflux disease) 09/20/2016    OMEPRAZOLE FOR 4-6 MONTHS OLD, DISCUSSED RISKS   • Hearing aid worn    • Hearing loss    • Hiatal hernia    • Hypertension, essential, benign    • Iron deficiency    • Leg swelling    • Lumbago 06/23/2015    CHRONIC, WILL REFILL MEDS WHEN SHE IS OUT OF THE MEDS SHE GETS FROM [facility] WILL NEED UDS AT THAT TIME COMPLETED CONSENT TODAY   • Seborrheic dermatitis 06/23/2015    WILL CONTINUE TREATMENT WITH KETAKONAZOLE SHAMPOO AND HAVE HER FOLLOW UP WITH DERM   • Sinus trouble    • SOB (shortness of breath)    • Vascular dementia (CMS/HCC)    • Vitamin D deficiency         Past Surgical History:   Procedure Laterality Date   • BLADDER SURGERY  1989   • CATARACT EXTRACTION     • CHOLECYSTECTOMY  1973   • COLONOSCOPY  2013   • ENDOSCOPY  2013   • HIATAL HERNIA REPAIR     • HYSTERECTOMY  1986   • REPLACEMENT TOTAL KNEE Left 2012   • SINUS SURGERY  1993, 1998, 2000        Current  Outpatient Medications on File Prior to Visit   Medication Sig Dispense Refill   • apixaban (Eliquis) 5 MG tablet tablet Take 1 tablet by mouth 2 (two) times a day.     • Ascorbic Acid (VITAMIN C PO) Vitamin C oral take 1  by oral route daily   Active     • chlorthalidone (HYGROTON) 25 MG tablet Take 1 tablet by mouth Daily.     • clobetasol (TEMOVATE) 0.05 % external solution Apply 1 application topically to the appropriate area as directed Daily.     • colestipol (COLESTID) 1 g tablet Take 1 tablet by mouth 2 (two) times a day. 60 tablet 0   • donepezil (ARICEPT) 10 MG tablet Take 10 mg by mouth Every Evening.     • HYDROcodone-acetaminophen (NORCO) 5-325 MG per tablet Take 1 tablet by mouth Daily. 30 tablet 0   • lisinopril (PRINIVIL,ZESTRIL) 10 MG tablet Take 1 tablet by mouth once daily 90 tablet 0   • QUEtiapine (SEROquel) 25 MG tablet Take 25 mg by mouth every night at bedtime.     • sertraline (ZOLOFT) 100 MG tablet Take 1 tablet by mouth once daily 90 tablet 0     No current facility-administered medications on file prior to visit.        Allergies   Allergen Reactions   • Atenolol Unknown - Low Severity   • Bisoprolol Fumarate Unknown - Low Severity   • Cephalexin Unknown - Low Severity   • Clonidine Unknown - Low Severity   • Diltiazem Hcl Unknown - Low Severity   • Doxycycline Hyclate Unknown - Low Severity   • Erythromycin Unknown - Low Severity   • Fosinopril Unknown - Low Severity   • Lisinopril Unknown - Low Severity   • Molds & Smuts Unknown - Low Severity   • Nitrofurantoin Unknown - Low Severity   • Oxybutynin Unknown - Low Severity   • Tetracyclines & Related Unknown - Low Severity   • Triamterene Unknown - Low Severity   • Trimethoprim Unknown - Low Severity   • Verapamil Unknown - Low Severity   • Yeast Unknown - Low Severity       Social History     Tobacco Use   Smoking Status Former Smoker   Tobacco Comment    AMOUNT USED 3-4 AGE START 16 AGE STOP 17          Objective   Vital Signs:   BP  "140/64   Pulse 64   Temp 97.4 °F (36.3 °C)   Resp 16   Ht 157.5 cm (62\")   Wt 95.7 kg (211 lb)   SpO2 93%   BMI 38.59 kg/m²     Physical Exam  Vitals reviewed.   Constitutional:       Appearance: Normal appearance.   HENT:      Head: Normocephalic and atraumatic.      Nose: Nose normal.      Mouth/Throat:      Mouth: Mucous membranes are moist.   Eyes:      Extraocular Movements: Extraocular movements intact.      Conjunctiva/sclera: Conjunctivae normal.      Pupils: Pupils are equal, round, and reactive to light.   Cardiovascular:      Rate and Rhythm: Normal rate and regular rhythm.   Pulmonary:      Effort: Pulmonary effort is normal.      Breath sounds: Normal breath sounds.   Abdominal:      General: Abdomen is flat. Bowel sounds are normal.      Palpations: Abdomen is soft.   Musculoskeletal:         General: Normal range of motion.   Neurological:      General: No focal deficit present.      Mental Status: She is alert and oriented to person, place, and time.   Psychiatric:         Mood and Affect: Mood normal.        Result Review :                 Assessment and Plan    Diagnoses and all orders for this visit:    1. Essential hypertension (Primary)  -     Comprehensive Metabolic Panel  -     CBC & Differential  -     TSH  -     Lipid Panel    2. Vascular dementia without behavioral disturbance (CMS/HCC)  Assessment & Plan:  Pt passed mmse, cont medicine for dementia. Keep follow up with Dr Raya as scheduled. Encouraged pt and/or care giver to let us know if sx worsen.    Orders:  -     Comprehensive Metabolic Panel  -     CBC & Differential  -     TSH  -     Lipid Panel    3. Deafness, unspecified laterality  Age related bilateral hearing loss.    4. Gastroesophageal reflux disease without esophagitis  -     Comprehensive Metabolic Panel  -     CBC & Differential  -     TSH  -     Lipid Panel    5. Urinary incontinence, unspecified type  Assessment & Plan:  UA WNL. Reviewed note from daughter " stating incontinence worsening but pt and care giver in office both deny issues    Orders:  -     Urinalysis With Culture If Indicated - Urine, Clean Catch      Follow Up   Return in about 3 months (around 10/16/2021) for with Dr. Raya.  Patient was given instructions and counseling regarding her condition or for health maintenance advice. Please see specific information pulled into the AVS if appropriate.

## 2021-07-18 PROBLEM — H91.90 DEAFNESS: Status: ACTIVE | Noted: 2021-07-18

## 2021-07-18 PROBLEM — R32 URINARY INCONTINENCE: Status: ACTIVE | Noted: 2021-07-18

## 2021-07-18 PROBLEM — F01.50 VASCULAR DEMENTIA WITHOUT BEHAVIORAL DISTURBANCE (HCC): Status: ACTIVE | Noted: 2021-07-18

## 2021-07-18 PROBLEM — I10 ESSENTIAL HYPERTENSION: Status: ACTIVE | Noted: 2021-07-18

## 2021-07-18 PROBLEM — K21.9 GASTROESOPHAGEAL REFLUX DISEASE WITHOUT ESOPHAGITIS: Status: ACTIVE | Noted: 2021-07-18

## 2021-07-19 NOTE — ASSESSMENT & PLAN NOTE
NAYELI WNL. Reviewed note from daughter stating incontinence worsening but pt and care giver in office both deny issues

## 2021-07-19 NOTE — ASSESSMENT & PLAN NOTE
Pt passed mmse, cont medicine for dementia. Keep follow up with Dr Raya as scheduled. Encouraged pt and/or care giver to let us know if sx worsen.

## 2021-07-20 ENCOUNTER — OFFICE VISIT (OUTPATIENT)
Dept: NEUROLOGY | Facility: CLINIC | Age: 86
End: 2021-07-20

## 2021-07-20 VITALS
BODY MASS INDEX: 38.46 KG/M2 | HEART RATE: 78 BPM | TEMPERATURE: 97.8 F | HEIGHT: 62 IN | SYSTOLIC BLOOD PRESSURE: 152 MMHG | WEIGHT: 209 LBS | DIASTOLIC BLOOD PRESSURE: 63 MMHG

## 2021-07-20 DIAGNOSIS — F02.818 DEMENTIA ASSOCIATED WITH OTHER UNDERLYING DISEASE WITH BEHAVIORAL DISTURBANCE (HCC): Primary | ICD-10-CM

## 2021-07-20 PROCEDURE — 99215 OFFICE O/P EST HI 40 MIN: CPT | Performed by: NURSE PRACTITIONER

## 2021-07-20 RX ORDER — QUETIAPINE FUMARATE 25 MG/1
TABLET, FILM COATED ORAL
Qty: 90 TABLET | Refills: 0 | OUTPATIENT
Start: 2021-07-20

## 2021-07-20 RX ORDER — DONEPEZIL HYDROCHLORIDE 10 MG/1
10 TABLET, FILM COATED ORAL EVERY EVENING
Qty: 30 TABLET | Refills: 3 | Status: SHIPPED | OUTPATIENT
Start: 2021-07-20

## 2021-07-20 RX ORDER — QUETIAPINE FUMARATE 25 MG/1
50 TABLET, FILM COATED ORAL
Qty: 30 TABLET | Refills: 3 | Status: SHIPPED | OUTPATIENT
Start: 2021-07-20 | End: 2022-01-28 | Stop reason: SDUPTHER

## 2021-07-20 NOTE — PROGRESS NOTES
"Chief Complaint  Memory Loss (f/u)    Subjective          Osiris Vila presents to [facility]  Presents to the clinic with her daughter today who is the primary historian.  Her daughter states that there has been significant progression in memory.  States that she is always irritated thinking that someone stole something from her.  Will think that her daughter took her razors.  Having worsening incontinence.  Unable to follow the directions to heat up a microwave dinner.  Forgets and leaves the water running when she leaves the room.  Worsening irritability.  Increased depression.  She remains on donepezil and 25 mg of Seroquel nightly.  Her family doctor started Zoloft and then recently increased it.  She has a waiver for in-home caregiving for of portion of the day however is left alone at times.  She does not drive.      Objective   Vital Signs:   /63 (BP Location: Left arm, Patient Position: Sitting, Cuff Size: Adult)   Pulse 78   Temp 97.8 °F (36.6 °C)   Ht 157.5 cm (62.01\")   Wt 94.8 kg (209 lb)   BMI 38.22 kg/m²     Physical Exam  HENT:      Head: Normocephalic.   Pulmonary:      Effort: Pulmonary effort is normal.   Neurological:      Mental Status: She is alert. She is disoriented.      Cranial Nerves: Cranial nerves are intact.      Sensory: Sensation is intact.      Motor: Motor function is intact.      Coordination: Coordination is intact.      Deep Tendon Reflexes: Reflexes are normal and symmetric.        Neurologic Exam     Result Review :               Assessment and Plan    Diagnoses and all orders for this visit:    1. 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  Discussed expectations with the daughter.  We will continue donepezil and increase Seroquel to 50 mg.  Discussed that it could take the Zoloft change a little more time to be effective. "  Continue to discussed that I do not feel under any circumstance that she should be left alone.  She does not have the mental capacity to care for herself.      Other orders  -     QUEtiapine (SEROquel) 25 MG tablet; Take 2 tablets by mouth every night at bedtime.  Dispense: 30 tablet; Refill: 3  -     donepezil (ARICEPT) 10 MG tablet; Take 1 tablet by mouth Every Evening.  Dispense: 30 tablet; Refill: 3    I spent 40 minutes caring for Osriis on this date of service. This time includes time spent by me in the following activities:preparing for the visit, obtaining and/or reviewing a separately obtained history, performing a medically appropriate examination and/or evaluation , counseling and educating the patient/family/caregiver, ordering medications, tests, or procedures and documenting information in the medical record  Follow Up   Return in about 3 months (around 10/20/2021) for memory f/u.  Patient was given instructions and counseling regarding her condition or for health maintenance advice. Please see specific information pulled into the AVS if appropriate.

## 2021-07-20 NOTE — PATIENT INSTRUCTIONS
Alzheimer Disease Caregiver Guide    Alzheimer disease causes a person to lose the ability to remember things and make decisions. A person who has Alzheimer disease may not be able to take care of himself or herself. He or she may need help with simple tasks. The tips below can help you care for the person.  What kind of changes does this condition cause?  This condition makes a person:  · Forget things.  · Feel confused.  · Act differently.  · Have different moods.  These things get worse with time.  Tips to help with symptoms  · Be calm and patient.  · Respond with a simple, short answer.  · Avoid correcting the person in a negative way.  · Try not to take things personally, even if the person forgets your name.  · Do not argue with the person. This may make the person more upset.  Tips to lessen frustration  · Make appointments and do daily tasks when the person is at his or her best.  · Take your time. Simple tasks may take longer. Allow plenty of time to complete tasks.  · Limit choices for the person.  · Involve the person in what you are doing.  · Keep a daily routine.  · Avoid new or crowded places, if possible.  · Use simple words, short sentences, and a calm voice. Only give one direction at a time.  · Buy clothes and shoes that are easy to put on and take off.  · Organize medicines in a pillbox for each day of the week.  · Keep a calendar in a central location to remind the person of meetings or other activities.  · Let people help if they offer. Take a break when needed.  Tips to prevent injury  · Keep floors clear. Remove rugs, magazine racks, and floor lamps.  · Keep hallways well-lit.  · Put a handrail and non-slip mat in the bathtub or shower.  · Put childproof locks on cabinets that have dangerous items in them. These items include medicine, alcohol, guns, toxic cleaning items, sharp tools, matches, and lighters.  · Put locks on doors where the person cannot see or reach them. This helps the person  to not wander out of the house and get lost.  · Be prepared for emergencies. Keep a list of emergency phone numbers and addresses close by.  · Bracelets may be worn that track location and identify the person as having memory problems. This should be worn at all times for safety.  Tips for the future  · Discuss financial and legal planning early. People with this disease have trouble managing their money as the disease gets worse. Get help from a professional.  · Talk about advance directives, safety, and daily care. Take these steps:  ? Create a living will and choose a power of . This is someone who can make decisions for the person with Alzheimer disease when he or she can no longer do so.  ? Discuss driving safety and when to stop driving. The person's doctor can help with this.  ? If the person lives alone, make sure he or she is safe. Some people need extra help at home. Other people need more care at a nursing home or care center.  Where to find support  You can find support by joining a support group near you. Some benefits of joining a support group include:  · Learning ways to manage stress.  · Sharing experiences with others.  · Getting emotional comfort and support.  · Learning about caregiving as the disease progresses.  · Knowing what community resources are available and making use of them.  Where to find more information  · Alzheimer's Association: www.alz.org  Contact a doctor if:  · The person has a fever.  · The person has a sudden behavior change that does not get better with calming strategies.  · The person is not able to take care of himself or herself at home.  · The person threatens you or anyone else, including himself or herself.  · You are no longer able to care for the person.  Summary  · Alzheimer disease causes a person to forget things and to be confused.  · A person who has this condition may not be able to take care of himself or herself.  · Take steps to keep the person  from getting hurt. Plan for future care.  · You can find support by joining a support group near you.  This information is not intended to replace advice given to you by your health care provider. Make sure you discuss any questions you have with your health care provider.  Document Revised: 04/07/2020 Document Reviewed: 12/13/2018  Elsevier Patient Education © 2021 Elsevier Inc.

## 2021-07-21 RX ORDER — HYDROCODONE BITARTRATE AND ACETAMINOPHEN 5; 325 MG/1; MG/1
1 TABLET ORAL DAILY
Qty: 30 TABLET | Refills: 0 | Status: SHIPPED | OUTPATIENT
Start: 2021-07-21 | End: 2021-07-23

## 2021-07-21 NOTE — TELEPHONE ENCOUNTER
Caller: MELANIE ELLISON    Relationship: Emergency Contact    Best call back number: [phone]    Medication needed:   Requested Prescriptions     Pending Prescriptions Disp Refills   • HYDROcodone-acetaminophen (NORCO) 5-325 MG per tablet 30 tablet 0     Sig: Take 1 tablet by mouth Daily.       When do you need the refill by: ASAP    Does the patient have less than a 3 day supply:  [x] Yes  [] No    What is the patient's preferred pharmacy:    [address] - [phone]  - [phone]   [phone]

## 2021-07-22 NOTE — TELEPHONE ENCOUNTER
Pharmacy called and said Norco 5 is on back order and not able to get any right now, Also they are needing to know if this is an acute or chronic med so they put that in for fill. Pharmacy said you could fill the Norco 10's and take half they do have those in stock if you wanted to do that. Please advise.

## 2021-07-23 PROBLEM — F01.518 VASCULAR DEMENTIA WITH BEHAVIOR DISTURBANCE: Status: ACTIVE | Noted: 2021-07-18

## 2021-07-23 RX ORDER — HYDROCODONE BITARTRATE AND ACETAMINOPHEN 10; 325 MG/1; MG/1
0.5 TABLET ORAL EVERY 6 HOURS PRN
Qty: 15 TABLET | Refills: 0 | Status: SHIPPED | OUTPATIENT
Start: 2021-07-23 | End: 2021-08-28 | Stop reason: SDUPTHER

## 2021-07-23 NOTE — ASSESSMENT & PLAN NOTE
Dementia is worsening.  Discussed expectations with the daughter.  We will continue donepezil and increase Seroquel to 50 mg.  Discussed that it could take the Zoloft change a little more time to be effective.  Continue to discussed that I do not feel under any circumstance that she should be left alone.  She does not have the mental capacity to care for herself.

## 2021-07-23 NOTE — TELEPHONE ENCOUNTER
Yes that's fine with me if patient is ok with it.   It is a chronic med, please be sure her daughter knows too as I think she helps her with her meds

## 2021-07-26 NOTE — TELEPHONE ENCOUNTER
Caller: MELANIE ELLISON    Relationship: Emergency Contact    Best call back number: [phone]  Medication needed:   Requested Prescriptions     Pending Prescriptions Disp Refills   • sertraline (ZOLOFT) 100 MG tablet 90 tablet 0     Sig: Take 1 tablet by mouth Daily.       When do you need the refill by: TODAY    What additional details did the patient provide when requesting the medication: [facility] WILL NOT FILL THE SCRIPT UNLESS THE OFFICE CONTACTS THEM.  BECAUSE IT IS A TRANSFER. THE SCRIPT FOR HYDROCODONE WAS FOR ONLY 15 PILLS AND THE DAUGHTER WOULD LIKE TO KNOW WHY.  Does the patient have less than a 3 day supply:  [x] Yes  [] No    What is the patient's preferred pharmacy:    [address] [phone] PH       PLEASE ADVISE

## 2021-07-27 RX ORDER — SERTRALINE HYDROCHLORIDE 100 MG/1
100 TABLET, FILM COATED ORAL DAILY
Qty: 90 TABLET | Refills: 0 | Status: SHIPPED | OUTPATIENT
Start: 2021-07-27 | End: 2021-10-12 | Stop reason: SDUPTHER

## 2021-08-02 ENCOUNTER — TELEPHONE (OUTPATIENT)
Dept: INTERNAL MEDICINE | Facility: CLINIC | Age: 86
End: 2021-08-02

## 2021-08-02 NOTE — TELEPHONE ENCOUNTER
Daughter called upset regarding zoloft medication refill. patient takes a total of 150mg daily and our office had only sent in 100 mgs. New rx sent to the pharmacy for the 50mg daughter notified.

## 2021-08-09 PROBLEM — F02.818 DEMENTIA ASSOCIATED WITH OTHER UNDERLYING DISEASE WITH BEHAVIORAL DISTURBANCE (HCC): Status: ACTIVE | Noted: 2021-07-18

## 2021-08-30 ENCOUNTER — PATIENT MESSAGE (OUTPATIENT)
Dept: INTERNAL MEDICINE | Facility: CLINIC | Age: 86
End: 2021-08-30

## 2021-08-31 RX ORDER — CHLORTHALIDONE 25 MG/1
25 TABLET ORAL DAILY
Qty: 90 TABLET | Refills: 1 | Status: SHIPPED | OUTPATIENT
Start: 2021-08-31 | End: 2022-01-28

## 2021-08-31 RX ORDER — HYDROCODONE BITARTRATE AND ACETAMINOPHEN 10; 325 MG/1; MG/1
0.5 TABLET ORAL EVERY 6 HOURS PRN
Qty: 15 TABLET | Refills: 0 | Status: SHIPPED | OUTPATIENT
Start: 2021-08-31 | End: 2021-10-12 | Stop reason: SDUPTHER

## 2021-08-31 NOTE — TELEPHONE ENCOUNTER
From: Osiris Vila  To: Ave Raya MD  Sent: 8/30/2021 6:56 PM EDT  Subject: Prescription Question    Please advise why on this website is the Clorthladone locked. I need a refill and it will not let me request it.  Need a timely response.

## 2021-09-03 ENCOUNTER — TELEPHONE (OUTPATIENT)
Dept: INTERNAL MEDICINE | Facility: CLINIC | Age: 86
End: 2021-09-03

## 2021-09-03 ENCOUNTER — TELEPHONE (OUTPATIENT)
Dept: NEUROLOGY | Facility: CLINIC | Age: 86
End: 2021-09-03

## 2021-09-03 NOTE — TELEPHONE ENCOUNTER
Caller: MELANIE ELLISON    Relationship: Emergency Contact    Best call back number: [phone]     What form or medical record are you requesting: ALL OF PATIENT'S MEDICAL RECORDS FROM 2021    Who is requesting this form or medical record from you: DAUGHTER, POA    THIS REQUEST WAS FILED 08/13/21 TO HAVE RECORDS SENT TO [facility]. PATIENT'S DAUGHTER STATED THAT [facility] CLAIMS THEY DO NOT HAVE THESE RECORDS. PLEASE SEND THESE RECORDS TO [facility].    PLEASE CALL AND ADVISE WHEN THIS HAS BEEN DONE.

## 2021-09-03 NOTE — TELEPHONE ENCOUNTER
Caller: MELANIE ELLISON    Relationship: Emergency Contact    Best call back number: [phone]    What form or medical record are you requesting: ALL NEURO RECS FROM 2021    Who is requesting this form or medical record from you: MELANIE/NADINE    How would you like to receive the form or medical records (pick-up, mail, fax): MAIL/EMAIL    If mail, what is the address: [address] [place] [id number]    Timeframe paperwork needed: ASAP    Additional notes: PATIENTS DAUGHTER/POA SAID SHE REQUESTED THESE ON 8-13 AND HASN'T RECEIVED ANYTHING YET.     EMAIL IS [email or web address]    MERLYN IS IN CHART

## 2021-09-07 ENCOUNTER — TELEPHONE (OUTPATIENT)
Dept: NEUROLOGY | Facility: CLINIC | Age: 86
End: 2021-09-07

## 2021-09-07 NOTE — TELEPHONE ENCOUNTER
Spoke to dtshelly Sommer-she states that she was nervous when the med had changed to 25mg-states that it seems to have leveled out now. Still having hallucinations-but understands that is part of the dementia. She just wants to make sure that we had not changed the quetiapine rx. She also wanted to know if mom refused to take meds or was completely out of control-should she take her to the hospital? She was offered a sooner appt., but states that she thinks the med issue has leveled out and that she is not able to take another day off work.

## 2021-09-08 ENCOUNTER — TELEPHONE (OUTPATIENT)
Dept: NEUROLOGY | Facility: CLINIC | Age: 86
End: 2021-09-08

## 2021-09-08 NOTE — TELEPHONE ENCOUNTER
Caller: MELANIE ELLISON  Relationship: PT'S DAUGHTER    Best call back number: [phone]    What form or medical record are you requesting: MEDICAL RECORDS RELEASE FORM    PT'S DAUGHTER MELANIE IS CALLING STATING THAT [facility] HAS NOT GOT THE RANULFO FORM. PLEASE REFAX TO THEM ASAP. PT'S DAUGHTER STATING THAT THIS HAS BEEN GOING ON SINCE 8-13-21 AND SHE STATES THAT THIS NEEDED TO BE DONE AS OF YESTERDAY.

## 2021-09-09 ENCOUNTER — TELEPHONE (OUTPATIENT)
Dept: INTERNAL MEDICINE | Facility: CLINIC | Age: 86
End: 2021-09-09

## 2021-09-09 DIAGNOSIS — F01.50 VASCULAR DEMENTIA WITHOUT BEHAVIORAL DISTURBANCE (HCC): Primary | ICD-10-CM

## 2021-09-09 NOTE — TELEPHONE ENCOUNTER
Patient is wanting referral placed to see a new neuro doctor. Pt is requesting Dr. Pasquale Hopper. Please advise if this is okay to place.

## 2021-09-12 ENCOUNTER — PATIENT MESSAGE (OUTPATIENT)
Dept: INTERNAL MEDICINE | Facility: CLINIC | Age: 86
End: 2021-09-12

## 2021-09-13 RX ORDER — QUETIAPINE FUMARATE 25 MG/1
50 TABLET, FILM COATED ORAL NIGHTLY
Qty: 60 TABLET | Refills: 2 | Status: SHIPPED | OUTPATIENT
Start: 2021-09-13 | End: 2021-10-08 | Stop reason: SDUPTHER

## 2021-09-14 NOTE — TELEPHONE ENCOUNTER
Caller: MELANIE ELLISON    Relationship: Emergency Contact    Best call back number: [phone]    Medication needed:   Requested Prescriptions     Pending Prescriptions Disp Refills   • lisinopril (PRINIVIL,ZESTRIL) 10 MG tablet 90 tablet 0     Sig: Take 1 tablet by mouth Daily.       When do you need the refill by: WITHIN THIS WEEK    What additional details did the patient provide when requesting the medication: HAS SOME LEFT    Does the patient have less than a 3 day supply:  [] Yes  [x] No    What is the patient's preferred pharmacy: [address] [phone] [facility] [phone]

## 2021-09-15 RX ORDER — LISINOPRIL 10 MG/1
10 TABLET ORAL DAILY
Qty: 90 TABLET | Refills: 1 | Status: SHIPPED | OUTPATIENT
Start: 2021-09-15 | End: 2021-10-08 | Stop reason: SDUPTHER

## 2021-09-16 ENCOUNTER — TELEPHONE (OUTPATIENT)
Dept: NEUROLOGY | Facility: CLINIC | Age: 86
End: 2021-09-16

## 2021-09-16 ENCOUNTER — DOCUMENTATION (OUTPATIENT)
Dept: NEUROLOGY | Facility: CLINIC | Age: 86
End: 2021-09-16

## 2021-09-16 RX ORDER — LISINOPRIL 10 MG/1
10 TABLET ORAL DAILY
Qty: 90 TABLET | Refills: 1 | OUTPATIENT
Start: 2021-09-16

## 2021-09-16 NOTE — TELEPHONE ENCOUNTER
Pt was scheduled for a video visit due to multiple questions and concerns coming from dtr on my chart. She was advised that it would be easier to do a video visit rather than going back and forth on my chart. Appt was celine for 9/16/21 at 330pm. Just prior to visit it was discovered that pt has been referred at dtr request to Dr Hopper. I was instructed to call dtr to see if appt was still needed, since she was establishing care with another provider for same diagnosis.  She stated that our office was the one that wanted the appt., I explained that the appt was recommended due to her multiple questions and concerns. She stated that her concerns and questions were all taking care of and that our office wanted the appt. I asked if she wanted to keep the appt since she no longer had multiple questions and concerns. She stated no. Appt has been cancelled.

## 2021-09-20 RX ORDER — LISINOPRIL 10 MG/1
10 TABLET ORAL DAILY
Qty: 90 TABLET | Refills: 0 | Status: SHIPPED | OUTPATIENT
Start: 2021-09-20 | End: 2021-12-14

## 2021-10-05 ENCOUNTER — PATIENT MESSAGE (OUTPATIENT)
Dept: INTERNAL MEDICINE | Facility: CLINIC | Age: 86
End: 2021-10-05

## 2021-10-06 ENCOUNTER — TELEPHONE (OUTPATIENT)
Dept: INTERNAL MEDICINE | Facility: CLINIC | Age: 86
End: 2021-10-06

## 2021-10-06 NOTE — TELEPHONE ENCOUNTER
Caller: MELANIE ELLISON    Relationship: Emergency Contact      Medication requested (name and dosage):   Eliquis 5MG     Pharmacy where request should be sent:    [place] [address] - [phone] PH - [phone] FX  [phone]      Additional details provided by patient:   PATIENT DAUGHTER STATED THAT THE RX ELOQUIS 5MG IS NOT SHOWING ON THE MEDS. ON MY END AS WELL IN THE MEDS LIST IS NOT SHOWING.       PATIENT DAUGHTER ALSO QUESTIONING IF THE MEDICATION WILL BE ADDED BACK TO THE LIST.    Best call back number: [phone]    Does the patient have less than a 3 day supply:  [] Yes  [x] No    Dayton Garrett Rep   10/06/21 10:07 EDT

## 2021-10-08 NOTE — TELEPHONE ENCOUNTER
"HUB READ \"FOR HUB TO READ\" TO DAUGHTER. HOWEVER, CALLER WAS NOT SATISFIED WITH THE MESSAGE LEFT FOR HUB TO READ.     Caller: MELANIE ELLISON    Relationship: Emergency Contact    Best call back number: [phone]    What is the best time to reach you: ANY     Who are you requesting to speak with (clinical staff, provider,  specific staff member):  PATRICK    Do you know the name of the person who called: PATRICK    What was the call regarding: DAUGHTER STATES THAT ELIQUIS IS NOT LISTED ON HER MOTHER'S MYCHART AND SHE IS UNABLE TO REORDER THE MEDICATION THROUGH [employer]. THEREFORE, DAUGHTER STATES THAT SHE IS AWARE  THAT HER MOTHER STILL NEEDS ELIQUIS. HOWEVER, WITH NO REFILLS LEFT, SHE NEEDS A NEW PRESCRIPTION SENT OVER TO [address] [phone] [facility] [phone] FX    DAUGHTER DISCUSSED HER FRUSTRATIONS WITH TRYING TO GET HER MOTHER'S MEDICATION FILLED. STATES THAT THE PATIENT ONLY HAS ONE DOSE LEFT. ALSO, WANTS ELIQUIS ADDED BACK TO THE PATIENT;S MEDICATION LIST [facility] IN ORDER FOR IT TO BE EASIER FOR DAUGHTER TO REFILL THE PRESCRIPTION.       Do you require a callback: YES     [facility] WAS UNABLE TO WARM TRANSFER TO THE CLINICAL NUMBER.     PLEASE ADVISE. THANKS.           "

## 2021-10-12 NOTE — TELEPHONE ENCOUNTER
Caller: Osiris Vila    Relationship: Self    Medication requested (name and dosage):    HYDROcodone-acetaminophen (NORCO)  MG per tablet     sertraline (ZOLOFT) 100 MG tablet    Pharmacy where request should be sent: [address] - [phone]  - [phone] FX    Best call back number: [phone]     Does the patient have less than a 3 day supply:  [] Yes  [x] No    Dayton Colmenares Rep   10/12/21 12:08 EDT

## 2021-10-13 NOTE — TELEPHONE ENCOUNTER
Patient has appointment on 10/22 can we get uds and consent then?    Last visit 7/19/21           UDS 2/18/20     Last erx 7/23/21            No consent on file

## 2021-10-14 RX ORDER — SERTRALINE HYDROCHLORIDE 100 MG/1
100 TABLET, FILM COATED ORAL DAILY
Qty: 90 TABLET | Refills: 0 | Status: SHIPPED | OUTPATIENT
Start: 2021-10-14 | End: 2021-10-22

## 2021-10-14 RX ORDER — HYDROCODONE BITARTRATE AND ACETAMINOPHEN 10; 325 MG/1; MG/1
0.5 TABLET ORAL EVERY 6 HOURS PRN
Qty: 15 TABLET | Refills: 0 | Status: SHIPPED | OUTPATIENT
Start: 2021-10-14 | End: 2021-10-22

## 2021-10-18 NOTE — TELEPHONE ENCOUNTER
From: Osiris Vila  To: Ave Raya MD  Sent: 10/5/2021 6:45 PM EDT  Subject: Prescription Question    Why is the Eliquis no longer listed on the medication list? This is making things very difficult to manage. Please refill for the 5mg 180 quantity.

## 2021-10-22 ENCOUNTER — OFFICE VISIT (OUTPATIENT)
Dept: INTERNAL MEDICINE | Facility: CLINIC | Age: 86
End: 2021-10-22

## 2021-10-22 VITALS
HEIGHT: 62 IN | OXYGEN SATURATION: 98 % | TEMPERATURE: 98.2 F | DIASTOLIC BLOOD PRESSURE: 78 MMHG | WEIGHT: 212 LBS | BODY MASS INDEX: 39.01 KG/M2 | HEART RATE: 88 BPM | SYSTOLIC BLOOD PRESSURE: 154 MMHG | RESPIRATION RATE: 12 BRPM

## 2021-10-22 DIAGNOSIS — G89.29 CHRONIC BILATERAL LOW BACK PAIN WITHOUT SCIATICA: ICD-10-CM

## 2021-10-22 DIAGNOSIS — M25.361 INSTABILITY OF RIGHT KNEE JOINT: ICD-10-CM

## 2021-10-22 DIAGNOSIS — F33.1 MAJOR DEPRESSIVE DISORDER, RECURRENT, MODERATE (HCC): Primary | ICD-10-CM

## 2021-10-22 DIAGNOSIS — I10 ESSENTIAL HYPERTENSION: ICD-10-CM

## 2021-10-22 DIAGNOSIS — F02.818 DEMENTIA ASSOCIATED WITH OTHER UNDERLYING DISEASE WITH BEHAVIORAL DISTURBANCE (HCC): ICD-10-CM

## 2021-10-22 DIAGNOSIS — H91.90 DEAFNESS, UNSPECIFIED LATERALITY: ICD-10-CM

## 2021-10-22 DIAGNOSIS — Z23 NEED FOR INFLUENZA VACCINATION: ICD-10-CM

## 2021-10-22 DIAGNOSIS — M54.50 CHRONIC BILATERAL LOW BACK PAIN WITHOUT SCIATICA: ICD-10-CM

## 2021-10-22 PROBLEM — E61.1 IRON DEFICIENCY: Status: ACTIVE | Noted: 2021-10-22

## 2021-10-22 PROBLEM — M54.9 BACK PAIN: Status: ACTIVE | Noted: 2021-10-22

## 2021-10-22 PROBLEM — K51.90 COLITIS, ULCERATIVE (HCC): Status: ACTIVE | Noted: 2021-10-22

## 2021-10-22 PROBLEM — J30.9 ALLERGIC RHINITIS: Status: ACTIVE | Noted: 2021-10-22

## 2021-10-22 PROBLEM — E55.9 VITAMIN D DEFICIENCY: Status: ACTIVE | Noted: 2021-10-22

## 2021-10-22 PROBLEM — K44.9 HIATAL HERNIA: Status: ACTIVE | Noted: 2021-10-22

## 2021-10-22 PROBLEM — M19.90 ARTHRITIS: Status: ACTIVE | Noted: 2021-10-22

## 2021-10-22 PROCEDURE — G0008 ADMIN INFLUENZA VIRUS VAC: HCPCS | Performed by: INTERNAL MEDICINE

## 2021-10-22 PROCEDURE — 99214 OFFICE O/P EST MOD 30 MIN: CPT | Performed by: INTERNAL MEDICINE

## 2021-10-22 PROCEDURE — 1159F MED LIST DOCD IN RCRD: CPT | Performed by: INTERNAL MEDICINE

## 2021-10-22 PROCEDURE — 1170F FXNL STATUS ASSESSED: CPT | Performed by: INTERNAL MEDICINE

## 2021-10-22 PROCEDURE — G0439 PPPS, SUBSEQ VISIT: HCPCS | Performed by: INTERNAL MEDICINE

## 2021-10-22 PROCEDURE — 90662 IIV NO PRSV INCREASED AG IM: CPT | Performed by: INTERNAL MEDICINE

## 2021-10-22 RX ORDER — HYDROCODONE BITARTRATE AND ACETAMINOPHEN 5; 325 MG/1; MG/1
1 TABLET ORAL DAILY
Qty: 30 TABLET | Refills: 0 | Status: SHIPPED | OUTPATIENT
Start: 2021-10-22 | End: 2022-01-18 | Stop reason: SDUPTHER

## 2021-10-22 RX ORDER — DESVENLAFAXINE 25 MG/1
25 TABLET, EXTENDED RELEASE ORAL DAILY
Qty: 90 TABLET | Refills: 1 | Status: SHIPPED | OUTPATIENT
Start: 2021-10-22 | End: 2022-01-28

## 2021-10-22 NOTE — PROGRESS NOTES
"Chief Complaint  Follow-up for dementia    Subjective          Osiris Vila presents to [facility]  History of Present Illness    Hearing has gotten a little worse  She has gotten to the point where she is turning up the TV very loudly    Chronic issues are getting worse  She is still tearful on a regular basis  She gets sad if she can't find her deoderant and then she starts crying    She isn't driving    Eats well, sleeps well, no complaints    At times stays up late and doesn't want to go to sleep    Pain is well controlled with hydrocodone    Objective   Vital Signs:   /78   Pulse 88   Temp 98.2 °F (36.8 °C)   Resp 12   Ht 157.5 cm (62.01\")   Wt 96.2 kg (212 lb)   SpO2 98%   BMI 38.76 kg/m²     Physical Exam  Vitals reviewed.   Constitutional:       Appearance: Normal appearance. She is well-developed. She is obese.   HENT:      Head: Normocephalic and atraumatic.      Right Ear: External ear normal.      Left Ear: External ear normal.   Eyes:      Conjunctiva/sclera: Conjunctivae normal.      Pupils: Pupils are equal, round, and reactive to light.   Cardiovascular:      Rate and Rhythm: Normal rate and regular rhythm.      Heart sounds: No murmur heard.  No friction rub. No gallop.    Pulmonary:      Effort: Pulmonary effort is normal.      Breath sounds: Normal breath sounds. No wheezing or rhonchi.   Musculoskeletal:      Comments: Using her cane   Skin:     General: Skin is warm and dry.   Neurological:      Mental Status: She is alert and oriented to person, place, and time.      Cranial Nerves: No cranial nerve deficit.   Psychiatric:         Mood and Affect: Affect normal.         Behavior: Behavior normal.         Thought Content: Thought content normal.        Result Review :     Common labs    Common Labsle 3/5/21 3/5/21 3/5/21 7/16/21 7/16/21 7/16/21    1025 1025 1025 1240 1240 1240   Glucose     90    Glucose  98       BUN  27 (A)   26 " (A)    Creatinine  0.96 (A)   0.99    eGFR Non African Am     53 (A)    Sodium  141   141    Potassium  3.9   4.2    Chloride  100   102    Calcium  10.0   9.6    Albumin  4.3   4.30    Total Bilirubin  0.87   0.6    Alkaline Phosphatase  50   47    AST (SGOT)  29   29    ALT (SGPT)  28   20    WBC 7.62   7.78     Hemoglobin 14.6   14.1     Hematocrit 45.5   42.2     Platelets 238   196     Total Cholesterol      209 (A)   Total Cholesterol   199      Triglycerides   142   222 (A)   HDL Cholesterol   52   46   LDL Cholesterol    119 (A)   124 (A)   (A) Abnormal value       Comments are available for some flowsheets but are not being displayed.              Procedures      Assessment and Plan    Diagnoses and all orders for this visit:    1. Major depressive disorder, recurrent, moderate (HCC) (Primary)  Comments:  Will stop Zoloft and start desvenlafaxine and see how she does with this    2. Instability of right knee joint  Comments:  Will try physical therapy for strengthening if no improvement could pursue further work-up  Orders:  -     Ambulatory Referral to Home Health    3. Essential hypertension  Comments:  A bit elevated here they will continue to monitor at home may need to adjust meds we will try controlling stress first and see if this helps    4. Deafness, unspecified laterality  Comments:  Discussed seeing audiology or ENT they will think about this    5. Need for influenza vaccination  -     Fluzone High-Dose 65+yrs    6. 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      7. Chronic bilateral low back pain without sciatica  Assessment & Plan:  Unfortunately cannot do NSAIDs therefore does 1 hydrocodone mainly at night to help  with sleep      Other orders  -     HYDROcodone-acetaminophen (NORCO) 5-325 MG per tablet; Take 1 tablet by mouth Daily.  Dispense: 30 tablet; Refill: 0  -     Desvenlafaxine Succinate ER 25 MG tablet sustained-release 24 hour; Take 1 tablet by mouth Daily.  Dispense: 90 tablet; Refill: 1  -     Fluzone High-Dose 65+yrs            Follow Up   Return in about 3 months (around 1/22/2022).  Patient was given instructions and counseling regarding her condition or for health maintenance advice. Please see specific information pulled into the AVS if appropriate.

## 2021-10-22 NOTE — PROGRESS NOTES
The ABCs of the Annual Wellness Visit  Subsequent Medicare Wellness Visit    Chief Complaint   Patient presents with   • Follow-up      Subjective    History of Present Illness:  Osiris Vila is a 89 y.o. female who presents for a Subsequent Medicare Wellness Visit.    The following portions of the patient's history were reviewed and   updated as appropriate: allergies, current medications, past family history, past medical history, past social history, past surgical history and problem list.    Compared to one year ago, the patient feels her physical   health is the same.    Compared to one year ago, the patient feels her mental   health is the same.    Recent Hospitalizations:  She was not admitted to the hospital during the last year.       Current Medical Providers:  Patient Care Team:  Ave Raya MD as PCP - General  Ave Raya MD as PCP - Family Medicine    Outpatient Medications Prior to Visit   Medication Sig Dispense Refill   • apixaban (Eliquis) 5 MG tablet tablet Take 1 tablet by mouth Every 12 (Twelve) Hours. 180 tablet 1   • Ascorbic Acid (VITAMIN C PO) Vitamin C oral take 1  by oral route daily   Active     • chlorthalidone (HYGROTON) 25 MG tablet Take 1 tablet by mouth Daily. 90 tablet 1   • clobetasol (TEMOVATE) 0.05 % external solution Apply 1 application topically to the appropriate area as directed Daily.     • donepezil (ARICEPT) 10 MG tablet Take 1 tablet by mouth Every Evening. 30 tablet 3   • lisinopril (PRINIVIL,ZESTRIL) 10 MG tablet Take 1 tablet by mouth Daily. 90 tablet 0   • QUEtiapine (SEROquel) 25 MG tablet Take 2 tablets by mouth every night at bedtime. 30 tablet 3   • sertraline (ZOLOFT) 100 MG tablet Take 1 tablet by mouth Daily. 90 tablet 0   • sertraline (ZOLOFT) 50 MG tablet Take 50 mg by mouth Daily.     • HYDROcodone-acetaminophen (NORCO)  MG per tablet Take 0.5 tablets by mouth Every 6 (Six) Hours As Needed for Moderate Pain . Chronic Medication 15  "tablet 0     No facility-administered medications prior to visit.       Opioid medication/s are on active medication list.  and I have evaluated her active treatment plan and pain score trends (see table).  There were no vitals filed for this visit.  I have reviewed the chart for potential of high risk medication and harmful drug interactions in the elderly.            Aspirin is not on active medication list.  on eliquis, slight risk of falls, will stay off for now.    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  ACP discussion was held with the patient during this visit. does have AD daughter is decision maker          Objective    Vitals:    10/22/21 1116   BP: 154/78   Pulse: 88   Resp: 12   Temp: 98.2 °F (36.8 °C)   SpO2: 98%   Weight: 96.2 kg (212 lb)   Height: 157.5 cm (62.01\")     BMI Readings from Last 1 Encounters:   10/22/21 38.76 kg/m²   BMI is above normal parameters. Recommendations include: nutrition counseling    Does the patient have evidence of cognitive impairment? Yes    Physical Exam            HEALTH RISK ASSESSMENT    Smoking Status:  Social History     Tobacco Use   Smoking Status Former Smoker   Smokeless Tobacco Never Used   Tobacco Comment    AMOUNT USED 3-4 AGE START 16 AGE STOP 17     Alcohol Consumption:  Social History     Substance and Sexual Activity   Alcohol Use Never     Fall Risk Screen:    STEADI Fall Risk Assessment was completed, and patient is at HIGH risk for falls. Assessment completed on:10/22/2021    Depression " Screening:  PHQ-2/PHQ-9 Depression Screening 10/22/2021   Little interest or pleasure in doing things 0   Feeling down, depressed, or hopeless 0   Trouble falling or staying asleep, or sleeping too much 1   Feeling tired or having little energy 3   Poor appetite or overeating 2   Feeling bad about yourself - or that you are a failure or have let yourself or your family down 0   Trouble concentrating on things, such as reading the newspaper or watching television 0   Moving or speaking so slowly that other people could have noticed. Or the opposite - being so fidgety or restless that you have been moving around a lot more than usual 0   Thoughts that you would be better off dead, or of hurting yourself in some way 0   Total Score 6   If you checked off any problems, how difficult have these problems made it for you to do your work, take care of things at home, or get along with other people? Not difficult at all       Health Habits and Functional and Cognitive Screening:  Functional & Cognitive Status 10/22/2021   Do you have difficulty preparing food and eating? Yes   Do you have difficulty bathing yourself, getting dressed or grooming yourself? No   Do you have difficulty using the toilet? No   Do you have difficulty moving around from place to place? Yes   Do you have trouble with steps or getting out of a bed or a chair? Yes   Current Diet Unhealthy Diet   Dental Exam Up to date   Eye Exam Up to date   Exercise (times per week) 0 times per week   Current Exercises Include No Regular Exercise   Do you need help using the phone?  No   Do you need help with transportation? Yes   Do you need help shopping? Yes   Do you need help preparing meals?  Yes   Do you need help with housework?  Yes   Do you need help with laundry? Yes   Do you need help taking your medications? No   Do you need help managing money? No   Do you ever drive or ride in a car without wearing a seat belt? No   Have you felt unusual stress, anger or  loneliness in the last month? No   Who do you live with? Child   If you need help, do you have trouble finding someone available to you? No   Have you been bothered in the last four weeks by sexual problems? No   Do you have difficulty concentrating, remembering or making decisions? No       Age-appropriate Screening Schedule:  Refer to the list below for future screening recommendations based on patient's age, sex and/or medical conditions. Orders for these recommended tests are listed in the plan section. The patient has been provided with a written plan.    Health Maintenance   Topic Date Due   • TDAP/TD VACCINES (1 - Tdap) Never done   • ZOSTER VACCINE (1 of 2) Never done   • DXA SCAN  05/04/2019   • INFLUENZA VACCINE  Completed              Assessment/Plan   CMS Preventative Services Quick Reference  Risk Factors Identified During Encounter  Dementia/Memory   Depression/Dysphoria  The above risks/problems have been discussed with the patient.  Follow up actions/plans if indicated are seen below in the Assessment/Plan Section.  Pertinent information has been shared with the patient in the After Visit Summary.    Diagnoses and all orders for this visit:    1. Major depressive disorder, recurrent, moderate (HCC) (Primary)  Comments:  Will stop Zoloft and start desvenlafaxine and see how she does with this    2. Instability of right knee joint  Comments:  Will try physical therapy for strengthening if no improvement could pursue further work-up  Orders:  -     Ambulatory Referral to Home Health    3. Essential hypertension  Comments:  A bit elevated here they will continue to monitor at home may need to adjust meds we will try controlling stress first and see if this helps    4. Deafness, unspecified laterality  Comments:  Discussed seeing audiology or ENT they will think about this    5. Need for influenza vaccination  -     Fluzone High-Dose 65+yrs    6. Dementia associated with other underlying disease with  behavioral disturbance (HCC)  Assessment & Plan:  Her dementia is unique because there are certain things she remembers very well and other things she has trouble with for example her clock draw was excellent today however at times she leaves the stove or water on etc.      7. Chronic bilateral low back pain without sciatica  Assessment & Plan:  Unfortunately cannot do NSAIDs therefore does 1 hydrocodone mainly at night to help with sleep      Other orders  -     HYDROcodone-acetaminophen (NORCO) 5-325 MG per tablet; Take 1 tablet by mouth Daily.  Dispense: 30 tablet; Refill: 0  -     Desvenlafaxine Succinate ER 25 MG tablet sustained-release 24 hour; Take 1 tablet by mouth Daily.  Dispense: 90 tablet; Refill: 1  -     Fluzone High-Dose 65+yrs      Follow Up:   Return in about 3 months (around 1/22/2022).     An After Visit Summary and PPPS were made available to the patient.               Doesn't want to do DEXA currently

## 2021-10-25 PROBLEM — M54.9 BACK PAIN: Status: RESOLVED | Noted: 2021-10-22 | Resolved: 2021-10-25

## 2021-10-25 NOTE — ASSESSMENT & PLAN NOTE
Her dementia is unique because there are certain things she remembers very well and other things she has trouble with for example her clock draw was excellent today however at times she leaves the stove or water on etc.

## 2021-10-29 ENCOUNTER — TELEPHONE (OUTPATIENT)
Dept: INTERNAL MEDICINE | Facility: CLINIC | Age: 86
End: 2021-10-29

## 2021-10-29 NOTE — TELEPHONE ENCOUNTER
Hub staff attempted to follow warm transfer process and was unsuccessful     Caller: MELANIE ELLISON    Relationship to patient: Emergency Contact    Best call back number: [phone]     Patient is needing: PATIENT'S DAUGHTER IS WANTING TO SPEAK WITH SOMEONE CLINICAL REGARDING THE PATIENT'S SERTALINE. SHE STATES THAT THE PHARMACY HAS TRIED TO REQUEST THIS SEVERAL TIMES AND THEY'RE TRYING TO GET THE MEDICATION BEFORE THE WEEKEND.

## 2021-12-03 ENCOUNTER — TELEPHONE (OUTPATIENT)
Dept: INTERNAL MEDICINE | Facility: CLINIC | Age: 86
End: 2021-12-03

## 2021-12-03 NOTE — TELEPHONE ENCOUNTER
Caller: RIDDHI     Relationship: HOME HEALTH NURSE    Best call back number: [phone]    What is the best time to reach you: ANYTIME    Who are you requesting to speak with (clinical staff, provider,  specific staff member): CLINICAL STAFF    What was the call regarding: NURSE WANTED TO INFOR THE MEDICAL ASSISTANT THAT THEY ARE DISCHARGING PATIENT TODAY (12-3-21) BECAUSE SHE HA MET ALL HER GOALS.    Do you require a callback: IF ANY QUESTIONS, PLEASE CALL

## 2021-12-13 ENCOUNTER — HOSPITAL ENCOUNTER (EMERGENCY)
Facility: HOSPITAL | Age: 86
Discharge: HOME | End: 2021-12-13
Attending: EMERGENCY MEDICINE | Admitting: EMERGENCY MEDICINE

## 2021-12-13 ENCOUNTER — APPOINTMENT (OUTPATIENT)
Dept: CARDIOLOGY | Facility: HOSPITAL | Age: 86
End: 2021-12-13

## 2021-12-13 ENCOUNTER — TELEPHONE (OUTPATIENT)
Dept: INTERNAL MEDICINE | Facility: CLINIC | Age: 86
End: 2021-12-13

## 2021-12-13 VITALS
TEMPERATURE: 98.3 F | HEART RATE: 71 BPM | SYSTOLIC BLOOD PRESSURE: 148 MMHG | OXYGEN SATURATION: 93 % | BODY MASS INDEX: 40 KG/M2 | WEIGHT: 217.37 LBS | DIASTOLIC BLOOD PRESSURE: 63 MMHG | HEIGHT: 62 IN | RESPIRATION RATE: 16 BRPM

## 2021-12-13 DIAGNOSIS — R60.0 PEDAL EDEMA: Primary | ICD-10-CM

## 2021-12-13 DIAGNOSIS — I87.2 VENOUS INSUFFICIENCY OF BOTH LOWER EXTREMITIES: ICD-10-CM

## 2021-12-13 LAB
ALBUMIN SERPL-MCNC: 4.2 G/DL (ref 3.5–5.2)
ALBUMIN/GLOB SERPL: 1.4 G/DL
ALP SERPL-CCNC: 51 U/L (ref 39–117)
ALT SERPL W P-5'-P-CCNC: 18 U/L (ref 1–33)
ANION GAP SERPL CALCULATED.3IONS-SCNC: 7.5 MMOL/L (ref 5–15)
AST SERPL-CCNC: 22 U/L (ref 1–32)
BASOPHILS # BLD AUTO: 0.06 10*3/MM3 (ref 0–0.2)
BASOPHILS NFR BLD AUTO: 0.6 % (ref 0–1.5)
BH CV LOWER VASCULAR LEFT COMMON FEMORAL AUGMENT: NORMAL
BH CV LOWER VASCULAR LEFT COMMON FEMORAL COMPETENT: NORMAL
BH CV LOWER VASCULAR LEFT COMMON FEMORAL COMPRESS: NORMAL
BH CV LOWER VASCULAR LEFT COMMON FEMORAL PHASIC: NORMAL
BH CV LOWER VASCULAR LEFT COMMON FEMORAL SPONT: NORMAL
BH CV LOWER VASCULAR RIGHT COMMON FEMORAL AUGMENT: NORMAL
BH CV LOWER VASCULAR RIGHT COMMON FEMORAL COMPETENT: NORMAL
BH CV LOWER VASCULAR RIGHT COMMON FEMORAL COMPRESS: NORMAL
BH CV LOWER VASCULAR RIGHT COMMON FEMORAL PHASIC: NORMAL
BH CV LOWER VASCULAR RIGHT COMMON FEMORAL SPONT: NORMAL
BH CV LOWER VASCULAR RIGHT DISTAL FEMORAL COMPRESS: NORMAL
BH CV LOWER VASCULAR RIGHT GREATER SAPH AK COMPRESS: NORMAL
BH CV LOWER VASCULAR RIGHT MID FEMORAL AUGMENT: NORMAL
BH CV LOWER VASCULAR RIGHT MID FEMORAL COMPETENT: NORMAL
BH CV LOWER VASCULAR RIGHT MID FEMORAL COMPRESS: NORMAL
BH CV LOWER VASCULAR RIGHT MID FEMORAL PHASIC: NORMAL
BH CV LOWER VASCULAR RIGHT MID FEMORAL SPONT: NORMAL
BH CV LOWER VASCULAR RIGHT PERONEAL COMPRESS: NORMAL
BH CV LOWER VASCULAR RIGHT POPLITEAL AUGMENT: NORMAL
BH CV LOWER VASCULAR RIGHT POPLITEAL COMPETENT: NORMAL
BH CV LOWER VASCULAR RIGHT POPLITEAL COMPRESS: NORMAL
BH CV LOWER VASCULAR RIGHT POPLITEAL PHASIC: NORMAL
BH CV LOWER VASCULAR RIGHT POPLITEAL SPONT: NORMAL
BH CV LOWER VASCULAR RIGHT POSTERIOR TIBIAL COMPRESS: NORMAL
BH CV LOWER VASCULAR RIGHT PROXIMAL FEMORAL COMPRESS: NORMAL
BILIRUB SERPL-MCNC: 0.6 MG/DL (ref 0–1.2)
BUN SERPL-MCNC: 25 MG/DL (ref 8–23)
BUN/CREAT SERPL: 24.5 (ref 7–25)
CALCIUM SPEC-SCNC: 10.3 MG/DL (ref 8.2–9.6)
CHLORIDE SERPL-SCNC: 105 MMOL/L (ref 98–107)
CO2 SERPL-SCNC: 31.5 MMOL/L (ref 22–29)
CREAT SERPL-MCNC: 1.02 MG/DL (ref 0.57–1)
DEPRECATED RDW RBC AUTO: 47.4 FL (ref 37–54)
EOSINOPHIL # BLD AUTO: 0.17 10*3/MM3 (ref 0–0.4)
EOSINOPHIL NFR BLD AUTO: 1.8 % (ref 0.3–6.2)
ERYTHROCYTE [DISTWIDTH] IN BLOOD BY AUTOMATED COUNT: 14.3 % (ref 12.3–15.4)
GFR SERPL CREATININE-BSD FRML MDRD: 51 ML/MIN/1.73
GLOBULIN UR ELPH-MCNC: 3 GM/DL
GLUCOSE SERPL-MCNC: 145 MG/DL (ref 65–99)
HCT VFR BLD AUTO: 42.4 % (ref 34–46.6)
HGB BLD-MCNC: 14 G/DL (ref 12–15.9)
IMM GRANULOCYTES # BLD AUTO: 0.06 10*3/MM3 (ref 0–0.05)
IMM GRANULOCYTES NFR BLD AUTO: 0.6 % (ref 0–0.5)
LYMPHOCYTES # BLD AUTO: 1.82 10*3/MM3 (ref 0.7–3.1)
LYMPHOCYTES NFR BLD AUTO: 19.2 % (ref 19.6–45.3)
MAXIMAL PREDICTED HEART RATE: 130 BPM
MCH RBC QN AUTO: 30.1 PG (ref 26.6–33)
MCHC RBC AUTO-ENTMCNC: 33 G/DL (ref 31.5–35.7)
MCV RBC AUTO: 91.2 FL (ref 79–97)
MONOCYTES # BLD AUTO: 0.55 10*3/MM3 (ref 0.1–0.9)
MONOCYTES NFR BLD AUTO: 5.8 % (ref 5–12)
NEUTROPHILS NFR BLD AUTO: 6.83 10*3/MM3 (ref 1.7–7)
NEUTROPHILS NFR BLD AUTO: 72 % (ref 42.7–76)
NRBC BLD AUTO-RTO: 0 /100 WBC (ref 0–0.2)
NT-PROBNP SERPL-MCNC: 226.2 PG/ML (ref 0–1800)
PLATELET # BLD AUTO: 212 10*3/MM3 (ref 140–450)
PMV BLD AUTO: 11.1 FL (ref 6–12)
POTASSIUM SERPL-SCNC: 3.9 MMOL/L (ref 3.5–5.2)
PROT SERPL-MCNC: 7.2 G/DL (ref 6–8.5)
RBC # BLD AUTO: 4.65 10*6/MM3 (ref 3.77–5.28)
SODIUM SERPL-SCNC: 144 MMOL/L (ref 136–145)
STRESS TARGET HR: 111 BPM
WBC NRBC COR # BLD: 9.49 10*3/MM3 (ref 3.4–10.8)

## 2021-12-13 PROCEDURE — 80053 COMPREHEN METABOLIC PANEL: CPT | Performed by: NURSE PRACTITIONER

## 2021-12-13 PROCEDURE — 85025 COMPLETE CBC W/AUTO DIFF WBC: CPT | Performed by: NURSE PRACTITIONER

## 2021-12-13 PROCEDURE — 36415 COLL VENOUS BLD VENIPUNCTURE: CPT

## 2021-12-13 PROCEDURE — 93971 EXTREMITY STUDY: CPT

## 2021-12-13 PROCEDURE — 83880 ASSAY OF NATRIURETIC PEPTIDE: CPT | Performed by: NURSE PRACTITIONER

## 2021-12-13 PROCEDURE — 36415 COLL VENOUS BLD VENIPUNCTURE: CPT | Performed by: NURSE PRACTITIONER

## 2021-12-13 PROCEDURE — 93971 EXTREMITY STUDY: CPT | Performed by: SURGERY

## 2021-12-13 PROCEDURE — 99283 EMERGENCY DEPT VISIT LOW MDM: CPT

## 2021-12-13 RX ORDER — FUROSEMIDE 20 MG/1
20 TABLET ORAL DAILY
Qty: 5 TABLET | Refills: 0 | Status: SHIPPED | OUTPATIENT
Start: 2021-12-13 | End: 2022-01-28

## 2021-12-13 NOTE — TELEPHONE ENCOUNTER
Hub staff attempted to follow warm transfer process and was unsuccessful     Caller: MELANIE ELLISON    Relationship to patient: Emergency Contact    Best call back number: [phone]    Patient is needing: THE PATIENT'S DAUGHTER HAS CALLED REQUESTING TO SPEAK WITH DR. WOODALL'S NURSE.     THE DAUGHTER IS CONCERNED AND SEEKING ADVICE ABOUT THE MOTHER'S RIGHT LEG.  SHE HAD STATED THE RIGHT CALF AND FOOT ARE SWOLLEN, THE PATIENT IS HAVING DIFFICULTY PUTTING PRESSURE ON THAT LEG AND DISCOLORATION IN RIGHT FOOT.     PLEASE CALL AND ADVISE. THANK YOU.

## 2021-12-13 NOTE — TELEPHONE ENCOUNTER
"3:00 PM -     Dgt calling concerned pt may have a blood clot.  Concerned pt's R leg is swollen and discolored.  \"Veins are buldging and dark\". Ankle is swollen.    Calf ~ 1/2 \" larger than L side.    She is on eliquis.    Hx blood clots    Referred to ED.  "

## 2021-12-13 NOTE — ED PROVIDER NOTES
Time: 5:51 PM EST  Arrived by: private car  Chief Complaint: Right leg swelling  History provided by: patient  History is limited by: nothing  History of Present Illness:  Patient is a 89 y.o. year old female that presents to the emergency department with a complaint of Right lower extremity swelling and pain.          HPI    Similar Symptoms Previously:yes  Recently seen: at urgent care    Patient Care Team  Primary Care Provider: Ave Raya MD    Past Medical History:     Allergies   Allergen Reactions   • Atenolol Unknown - Low Severity   • Bisoprolol Fumarate Unknown - Low Severity   • Cephalexin Unknown - Low Severity   • Clonidine Unknown - Low Severity   • Diltiazem Hcl Unknown - Low Severity   • Doxycycline Hyclate Unknown - Low Severity   • Erythromycin Unknown - Low Severity   • Fosinopril Unknown - Low Severity   • Molds & Smuts Unknown - Low Severity   • Nitrofurantoin Unknown - Low Severity   • Oxybutynin Unknown - Low Severity   • Tetracyclines & Related Unknown - Low Severity   • Triamterene Unknown - Low Severity   • Trimethoprim Unknown - Low Severity   • Verapamil Unknown - Low Severity   • Yeast Unknown - Low Severity     Past Medical History:   Diagnosis Date   • Actinic keratosis 09/20/2016    LESIONS FROZEN TODAY   • Allergic rhinitis    • Arthritis    • Back pain    • Colitis, ulcerative (HCC)    • Condition not found     HERNIA   • Deafness    • GERD (gastroesophageal reflux disease) 09/20/2016    OMEPRAZOLE FOR 4-6 MONTHS OLD, DISCUSSED RISKS   • Hearing aid worn    • Hearing loss    • Hiatal hernia    • Hypertension, essential, benign    • Iron deficiency    • Leg swelling    • Lumbago 06/23/2015    CHRONIC, WILL REFILL MEDS WHEN SHE IS OUT OF THE MEDS SHE GETS FROM [facility] WILL NEED UDS AT THAT TIME COMPLETED CONSENT TODAY   • Seborrheic dermatitis 06/23/2015    WILL CONTINUE TREATMENT WITH KETAKONAZOLE SHAMPOO AND HAVE HER FOLLOW UP WITH DERM   • Sinus trouble    • SOB (shortness  of breath)    • Vascular dementia (HCC)    • Vitamin D deficiency      Past Surgical History:   Procedure Laterality Date   • BLADDER SURGERY  1989   • CATARACT EXTRACTION     • CHOLECYSTECTOMY  1973   • COLONOSCOPY  2013   • ENDOSCOPY  2013   • HIATAL HERNIA REPAIR     • HYSTERECTOMY  1986   • REPLACEMENT TOTAL KNEE Left 2012   • SINUS SURGERY  1993, 1998, 2000     Family History   Problem Relation Age of Onset   • Cancer Mother    • Arthritis Father    • Heart disease Father    • Pancreatic cancer Brother 65   • Diabetes Brother        Home Medications:  Prior to Admission medications    Medication Sig Start Date End Date Taking? Authorizing Provider   apixaban (Eliquis) 5 MG tablet tablet Take 1 tablet by mouth Every 12 (Twelve) Hours. 10/8/21   Ave Raya MD   Ascorbic Acid (VITAMIN C PO) Vitamin C oral take 1  by oral route daily   Active    Provider, MD Sheela   chlorthalidone (HYGROTON) 25 MG tablet Take 1 tablet by mouth Daily. 8/31/21   Ave Raya MD   clobetasol (TEMOVATE) 0.05 % external solution Apply 1 application topically to the appropriate area as directed Daily. 3/5/21   ProviderSheela MD   Desvenlafaxine Succinate ER 25 MG tablet sustained-release 24 hour Take 1 tablet by mouth Daily. 10/22/21   Ave Raya MD   donepezil (ARICEPT) 10 MG tablet Take 1 tablet by mouth Every Evening. 7/20/21   Meri Tracey APRN   HYDROcodone-acetaminophen (NORCO) 5-325 MG per tablet Take 1 tablet by mouth Daily. 10/22/21   Ave Raya MD   lisinopril (PRINIVIL,ZESTRIL) 10 MG tablet Take 1 tablet by mouth Daily. 9/20/21   Daisy Gillette, GEOVANI   QUEtiapine (SEROquel) 25 MG tablet Take 2 tablets by mouth every night at bedtime. 7/20/21   Meri Tracey APRN        Social History:   Social History     Tobacco Use   • Smoking status: Former Smoker   • Smokeless tobacco: Never Used   • Tobacco comment: AMOUNT USED 3-4 AGE START 16 AGE STOP 17   Substance Use Topics   •  "Alcohol use: Never   • Drug use: Not on file     Recent travel: no     Review of Systems:  Review of Systems   Constitutional: Negative.    HENT: Negative.    Eyes: Negative.    Respiratory: Negative.    Cardiovascular: Positive for leg swelling. Negative for chest pain and palpitations.        Right lower extremity   Gastrointestinal: Negative.    Endocrine: Negative.    Genitourinary: Negative.    Musculoskeletal: Negative for arthralgias, back pain, gait problem, joint swelling, myalgias, neck pain and neck stiffness.   Skin: Negative for color change, pallor, rash and wound.   Allergic/Immunologic: Negative.    Neurological: Negative.    Hematological: Negative.    Psychiatric/Behavioral: Negative.         Physical Exam:  /63   Pulse 71   Temp 98.3 °F (36.8 °C) (Oral)   Resp 16   Ht 157.5 cm (62\")   Wt 98.6 kg (217 lb 6 oz)   SpO2 93%   BMI 39.76 kg/m²     Physical Exam  Constitutional:       Appearance: Normal appearance.   HENT:      Head: Normocephalic and atraumatic.      Right Ear: External ear normal.      Left Ear: External ear normal.      Nose: Nose normal.      Mouth/Throat:      Mouth: Mucous membranes are moist.      Pharynx: Oropharynx is clear.   Eyes:      Extraocular Movements: Extraocular movements intact.      Conjunctiva/sclera: Conjunctivae normal.      Pupils: Pupils are equal, round, and reactive to light.   Cardiovascular:      Rate and Rhythm: Normal rate and regular rhythm.      Pulses: Normal pulses.      Heart sounds: Normal heart sounds.   Pulmonary:      Effort: Pulmonary effort is normal.      Breath sounds: Normal breath sounds.   Abdominal:      General: Bowel sounds are normal.      Palpations: Abdomen is soft.   Musculoskeletal:         General: Swelling and tenderness present. No signs of injury.      Cervical back: Normal range of motion and neck supple.      Right lower leg: Edema present.      Left lower leg: Edema present.   Skin:     General: Skin is warm and " dry.      Capillary Refill: Capillary refill takes less than 2 seconds.   Neurological:      General: No focal deficit present.      Mental Status: She is alert and oriented to person, place, and time. Mental status is at baseline.   Psychiatric:         Mood and Affect: Mood normal.         Behavior: Behavior normal.         Thought Content: Thought content normal.         Judgment: Judgment normal.                Medications in the Emergency Department:  Medications - No data to display     Labs  Lab Results (last 24 hours)     ** No results found for the last 24 hours. **           Imaging:  No Radiology Exams Resulted Within Past 24 Hours    Procedures:  Procedures    Progress                            Medical Decision Making:  [facility]     Final diagnoses:   Pedal edema   Venous insufficiency of both lower extremities        Disposition:  ED Disposition     ED Disposition Condition Comment    Discharge Stable           This medical record created using voice recognition software and may contain unintended errors.           Raymond Hernandez,   12/15/21 1744       Raymond Hernandez,   12/15/21 1744

## 2021-12-13 NOTE — DISCHARGE INSTRUCTIONS
Wear compression hose as directed.  Take Lasix as directed.  Elevate your legs.  Return for worsening symptoms.  Follow-up with Dr. Raya in 3 to 5 days if no better.

## 2021-12-14 RX ORDER — LISINOPRIL 10 MG/1
TABLET ORAL
Qty: 90 TABLET | Refills: 0 | Status: SHIPPED | OUTPATIENT
Start: 2021-12-14 | End: 2022-03-14

## 2021-12-15 RX ORDER — DONEPEZIL HYDROCHLORIDE 10 MG/1
TABLET, FILM COATED ORAL
Qty: 30 TABLET | Refills: 0 | OUTPATIENT
Start: 2021-12-15

## 2022-01-18 NOTE — TELEPHONE ENCOUNTER
Caller: MELANIE ELLISON    Relationship: Emergency Contact    Best call back number: [phone]    Requested Prescriptions:   Requested Prescriptions     Pending Prescriptions Disp Refills   • HYDROcodone-acetaminophen (NORCO) 5-325 MG per tablet 30 tablet 0     Sig: Take 1 tablet by mouth Daily.        [address] [facility] - [phone]  - [phone] FX     Additional details provided by patient:     Does the patient have less than a 3 day supply:  [x] Yes  [] No    Dayton Robles Rep   01/18/22 12:11 EST

## 2022-01-20 RX ORDER — HYDROCODONE BITARTRATE AND ACETAMINOPHEN 5; 325 MG/1; MG/1
1 TABLET ORAL DAILY
Qty: 30 TABLET | Refills: 0 | Status: SHIPPED | OUTPATIENT
Start: 2022-01-20 | End: 2022-02-22 | Stop reason: SDUPTHER

## 2022-01-28 ENCOUNTER — OFFICE VISIT (OUTPATIENT)
Dept: INTERNAL MEDICINE | Facility: CLINIC | Age: 87
End: 2022-01-28

## 2022-01-28 VITALS
HEIGHT: 62 IN | OXYGEN SATURATION: 96 % | SYSTOLIC BLOOD PRESSURE: 118 MMHG | BODY MASS INDEX: 39.93 KG/M2 | TEMPERATURE: 97.4 F | DIASTOLIC BLOOD PRESSURE: 68 MMHG | HEART RATE: 87 BPM | WEIGHT: 217 LBS

## 2022-01-28 DIAGNOSIS — G31.09 FRONTAL LOBE DEMENTIA: Primary | ICD-10-CM

## 2022-01-28 DIAGNOSIS — R06.00 DYSPNEA, UNSPECIFIED TYPE: ICD-10-CM

## 2022-01-28 DIAGNOSIS — F02.80 FRONTAL LOBE DEMENTIA: Primary | ICD-10-CM

## 2022-01-28 DIAGNOSIS — M19.90 ARTHRITIS: ICD-10-CM

## 2022-01-28 PROBLEM — F33.1 MAJOR DEPRESSIVE DISORDER, RECURRENT, MODERATE: Status: ACTIVE | Noted: 2021-11-05

## 2022-01-28 PROCEDURE — 99214 OFFICE O/P EST MOD 30 MIN: CPT | Performed by: INTERNAL MEDICINE

## 2022-01-28 RX ORDER — QUETIAPINE FUMARATE 50 MG/1
TABLET, FILM COATED ORAL
Qty: 90 TABLET | Refills: 1 | Status: SHIPPED | OUTPATIENT
Start: 2022-01-28 | End: 2022-10-28 | Stop reason: SDUPTHER

## 2022-01-28 NOTE — PROGRESS NOTES
Chief Complaint  Hypertension, Shortness of Breath (more and more short of breath), and hands swelling    Subjective          Osiris Vila presents to [facility]  History of Present Illness    Arthritis-   Her hands are worsening  She has a lot of pain in the hands  It is worse when the weather worsens  She takes her hydrocodone to help with her pain    She is having worsening trouble breathing  Mostly when she gets up and moves across the room or going up stairs  Daughter says that at times even putting her shoes on makes her have trouble breathing    She is starting to have more behavior issues at home  She is refusing to let her aides help with bathing.  Her hearing is worsening.  I noticed this on exam as she continues to ask me to repeat even when I am speaking loudly.  She often needs help with someone holding her arm as she walks she is unwilling to use assistive devices.  She continues to have urinary and fecal accidents soiling her clothes, the floor and furniture.  Unfortunately the most difficult thing for her and her family is her emotional outbursts.  She becomes extremely tearful and loud.  She is getting angry at times particularly when she has trouble finding things that she has misplaced.  These are often things that her daughter finds very quickly when she goes in to look for them.  She is increasingly difficult to get up and out for appointments as she is beginning to have trouble doing the basic self-care things or understanding the concept of time that she needs to move and be ready to go.  She is slamming doors when she is angry as well  They do feel like Increasing the Seroquel has helped.    They follow up with neurology soon.  He believes she has frontotemporal dementia.      Objective   Vital Signs:   /68 (BP Location: Left arm, Patient Position: Sitting, Cuff Size: Large Adult)   Pulse 87   Temp 97.4 °F (36.3 °C) (Temporal)   Ht  "157.5 cm (62\")   Wt 98.4 kg (217 lb)   SpO2 96%   BMI 39.69 kg/m²     Physical Exam  Vitals reviewed.   Constitutional:       Appearance: Normal appearance. She is well-developed.   HENT:      Head: Normocephalic and atraumatic.      Right Ear: External ear normal.      Left Ear: External ear normal.   Eyes:      Conjunctiva/sclera: Conjunctivae normal.      Pupils: Pupils are equal, round, and reactive to light.   Cardiovascular:      Rate and Rhythm: Normal rate and regular rhythm.      Heart sounds: No murmur heard.  No friction rub. No gallop.    Pulmonary:      Effort: Pulmonary effort is normal.      Breath sounds: Normal breath sounds. No wheezing or rhonchi.   Skin:     General: Skin is warm and dry.   Neurological:      Mental Status: She is alert.      Cranial Nerves: No cranial nerve deficit.      Comments: She does know herself as well as her age   Psychiatric:         Mood and Affect: Affect normal.      Comments: eMotional and tearful on exam as well as very repetitive with several of her statements        Result Review :       Common labs    Common Labsle 3/5/21 3/5/21 3/5/21 7/16/21 7/16/21 7/16/21 12/13/21 12/13/21    1025 1025 1025 1240 1240 1240 1622 1622   Glucose  98   90   145 (A)   BUN  27 (A)   26 (A)   25 (A)   Creatinine  0.96 (A)   0.99   1.02 (A)   eGFR Non  Am     53 (A)   51 (A)   Sodium  141   141   144   Potassium  3.9   4.2   3.9   Chloride  100   102   105   Calcium  10.0   9.6   10.3 (A)   Albumin  4.3   4.30   4.20   Total Bilirubin  0.87   0.6   0.6   Alkaline Phosphatase  50   47   51   AST (SGOT)  29   29   22   ALT (SGPT)  28   20   18   WBC 7.62   7.78   9.49    Hemoglobin 14.6   14.1   14.0    Hematocrit 45.5   42.2   42.4    Platelets 238   196   212    Total Cholesterol      209 (A)     Total Cholesterol   199        Triglycerides   142   222 (A)     HDL Cholesterol   52   46     LDL Cholesterol    119 (A)   124 (A)     (A) Abnormal value       Comments are " available for some flowsheets but are not being displayed.                    Procedures        Assessment and Plan    Diagnoses and all orders for this visit:    1. Frontal lobe dementia (HCC) (Primary)  Comments:  unfortuantely getting worse; I no longer recommend     2. Dyspnea, unspecified type  Comments:  not horrible; will start with CXR  Orders:  -     XR Chest PA & Lateral (In Office)    3. Arthritis  Comments:  cont hydrocodone and voltaren for now    Other orders  -     Diclofenac Sodium (Voltaren) 1 % gel gel; Apply 4 g topically to the appropriate area as directed 4 (Four) Times a Day As Needed (hand pain).  Dispense: 100 g; Refill: 1  -     QUEtiapine (SEROquel) 50 MG tablet; One in AM and two in the PM  Dispense: 90 tablet; Refill: 1    Unfortunately her dementia appears to be significantly worsening.  She has recently seen a neurologist who believes she has frontal lobe dementia.  Her behaviors and self-care are worsening.  Due to her significant memory issues and worsening behaviors I do think it is unsafe for her to be by herself at home at this time.  Have discussed this with her family due to their work schedules is difficult for them to care for her at this time.  Although she is having worsening behavioral outbursts I do not believe she would be difficult to care for in a facility as she is currently not aggressive or hitting.  Will try increasing her Seroquel by 50 mg at night to see if this may help with some of her behaviors.  Will stop Pristiq as pill burden is becoming a bit of a problem.            Follow Up   Return in about 3 months (around 4/28/2022).  Patient was given instructions and counseling regarding her condition or for health maintenance advice. Please see specific information pulled into the AVS if appropriate.

## 2022-02-02 ENCOUNTER — TELEPHONE (OUTPATIENT)
Dept: INTERNAL MEDICINE | Facility: CLINIC | Age: 87
End: 2022-02-02

## 2022-02-02 NOTE — TELEPHONE ENCOUNTER
Caller: TERRA MELANIE     Relationship: Emergency Contact    Best call back number: [phone]    What medications are you currently taking:   Current Outpatient Medications on File Prior to Visit   Medication Sig Dispense Refill   • apixaban (Eliquis) 5 MG tablet tablet Take 1 tablet by mouth Every 12 (Twelve) Hours. 180 tablet 1   • Ascorbic Acid (VITAMIN C PO) Vitamin C oral take 1  by oral route daily   Active     • calcium citrate-vitamin d (CITRACAL) 200-250 MG-UNIT tablet tablet Take 1 tablet by mouth Daily.     • Diclofenac Sodium (Voltaren) 1 % gel gel Apply 4 g topically to the appropriate area as directed 4 (Four) Times a Day As Needed (hand pain). 100 g 1   • donepezil (ARICEPT) 10 MG tablet Take 1 tablet by mouth Every Evening. 30 tablet 3   • HYDROcodone-acetaminophen (NORCO) 5-325 MG per tablet Take 1 tablet by mouth Daily. 30 tablet 0   • lisinopril (PRINIVIL,ZESTRIL) 10 MG tablet Take 1 tablet by mouth once daily 90 tablet 0   • QUEtiapine (SEROquel) 50 MG tablet One in AM and two in the PM 90 tablet 1     No current facility-administered medications on file prior to visit.          Which medication are you concerned about:   CHLOTHALIDONE 25 MG    Who prescribed you this medication: ELISSA WOODALL    What are your concerns: PATIENT'S DAUGHTER CALLED TO LET US KNOW THE THE MEDICATION HAS BEEN TAKEN OFF OF ACTIVE MEDICATION LIST FOR PATIENT. SHE CURRENTLY TAKES THE MEDICATION AND NEEDS IT TO BE PUT BACK ON THERE AS SOON AS POSSIBLE.     How long have you had these concerns: 01/30/2022 INSERTED [employer] MESSAGE REGARDING THIS. STILL HAVEN'T RECEIVED A RESPONSE.

## 2022-02-02 NOTE — TELEPHONE ENCOUNTER
Caller: MELANIE ELLISON    Relationship: Emergency Contact    Best call back number: [phone]     What form or medical record are you requesting: SIGNED ORDER FROM [facility] SO PATIENT CAN CONTINUE RECEIVED ADULT DIAPERS FROM COMPANY THAT SUPPLIES THESE    Who is requesting this form or medical record from you: COMPANY THAT SUPPLIES ADULT DIAPERS TO PATIENT    How would you like to receive the form or medical records (pick-up, mail, fax): FAX  If fax, what is the fax number: PATIENT'S DAUGHTER UNSURE OF FAX, BUT STATES IT IS LISTED ON PAPER    Timeframe paperwork needed: AS SOON AS POSSIBLE    Additional notes: PATIENT'S DAUGHTER MENTIONED THAT PATIENT IS ALMOST OUT OF ADULT DIAPERS AND THE COMPANY THAT PATIENT SUPPLIES THESE FOR PATIENT FAXED A REQUEST FOR AN ORDER TWICE TO THE OFFICE, BUT STILL HAS NOT RECEIVED ANYTHING REGARDING THIS YET, AND IN ORDER FOR PATIENT TO GET THESE, THEY NEED THE ORDER FILLED OUT BY ELISSA WOODALL AS SOON AS POSSIBLE AND SHE IS ALMOST OUT.

## 2022-02-03 RX ORDER — CHLORTHALIDONE 25 MG/1
25 TABLET ORAL DAILY
Qty: 30 TABLET | Refills: 2 | Status: SHIPPED | OUTPATIENT
Start: 2022-02-03 | End: 2022-04-12 | Stop reason: SDUPTHER

## 2022-02-22 RX ORDER — HYDROCODONE BITARTRATE AND ACETAMINOPHEN 5; 325 MG/1; MG/1
1 TABLET ORAL DAILY
Qty: 30 TABLET | Refills: 0 | Status: SHIPPED | OUTPATIENT
Start: 2022-02-22 | End: 2022-04-08 | Stop reason: SDUPTHER

## 2022-02-22 NOTE — TELEPHONE ENCOUNTER
Caller: MELANIE ELLISON    Relationship: Emergency Contact    Best call back number: [phone]    Requested Prescriptions:   Requested Prescriptions     Pending Prescriptions Disp Refills   • HYDROcodone-acetaminophen (NORCO) 5-325 MG per tablet 30 tablet 0     Sig: Take 1 tablet by mouth Daily.        [address] [facility] - [phone]  - [phone] FX     Does the patient have less than a 3 day supply:  [x] Yes  [] No    Dayton Colmenares Rep   02/22/22 12:07 EST

## 2022-02-22 NOTE — TELEPHONE ENCOUNTER
Refill request for  controlled substance.      Date of request: 2/22/2022  (Please change date if request date is different than today's)  Medication requested: Norco   Last OV: 1/28/22  Last UDS: Not one Noted  Contract signed: no    Date:No  Next office visit: 4/29/22    Mingo Hernandez, CMA

## 2022-03-14 RX ORDER — LISINOPRIL 10 MG/1
TABLET ORAL
Qty: 90 TABLET | Refills: 0 | Status: SHIPPED | OUTPATIENT
Start: 2022-03-14 | End: 2022-06-20

## 2022-03-15 ENCOUNTER — TELEPHONE (OUTPATIENT)
Dept: INTERNAL MEDICINE | Facility: CLINIC | Age: 87
End: 2022-03-15

## 2022-03-15 NOTE — TELEPHONE ENCOUNTER
Caller: Osiris Vila    Relationship: Self  REFERRAL TO [facility] FAX#  [phone]

## 2022-03-15 NOTE — LETTER
2022    Osiris Vila  [address] [address]    To Whom It May Concern:    Please admit Osiris Vila,  1931, to long term care with the following diagnoses.       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   Essential hypertension ICD-10-CM: I10   ICD-9-CM: 401.9   2021   Gastroesophageal reflux disease without esophagitis ICD-10-CM: K21.9   ICD-9-CM: 530.81   2021   Urinary incontinence ICD-10-CM: R32   ICD-9-CM: 788.30   2021   More...   Allergic rhinitis ICD-10-CM: J30.9   ICD-9-CM: 477.9   10/22/2021   Low back pain ICD-10-CM: M54.50   ICD-9-CM: 724.2   2015   More...   Iron deficiency ICD-10-CM: E61.1   ICD-9-CM: 280.9   10/22/2021   Colitis, ulcerative (HCC) ICD-10-CM: K51.90   ICD-9-CM: 556.9   10/22/2021   Arthritis ICD-10-CM: M19.90   ICD-9-CM: 716.90   10/22/2021   Actinic keratosis ICD-10-CM: L57.0   ICD-9-CM: 702.0   2016   Hiatal hernia ICD-10-CM: K44.9   ICD-9-CM: 553.3   10/22/2021   Seborrheic dermatitis ICD-10-CM: L21.9   ICD-9-CM: 690.10   2015   Vitamin D deficiency ICD-10-CM: E55.9   ICD-9-CM: 268.9   10/22/2021   Major depressive disorder, recurrent, moderate (HCC) ICD-10-CM: F33.1   ICD-9-CM: 296.32   2021     Please call for any additional questions.            Ave Raya MD

## 2022-03-16 NOTE — TELEPHONE ENCOUNTER
I'm sorry, that is totally my fault.  I read it as someone calling on her behalf, and not her calling herself.  I did call her, and left a message to call us back to clarify what she needs.

## 2022-03-16 NOTE — TELEPHONE ENCOUNTER
Caller: MELANIE ELLISON    Relationship: Emergency Contact    Best call back number: [phone]    Who are you requesting to speak with (clinical staff, provider,  specific staff member): VIVIANE    What was the call regarding: PATIENTS DAUGHTER MISSED A CALL FROM VIVIANE AND WAS ATTEMPTED TO BE TRANSFERRED TWO TIMES PREVIOUSLY TO CALLING THIS TIME. SHE WOULD REALLY LIKE TO SPEAK WITH VIVIANE. PLEASE CALL PATIENTS DAUGHTER AS SOON AS POSSIBLE.

## 2022-03-16 NOTE — TELEPHONE ENCOUNTER
"Red rule verified and correct.    Needing a \"referral\" to be admitted to an ECF.    Please include updated med list and LOV.  "

## 2022-03-28 RX ORDER — APIXABAN 5 MG/1
TABLET, FILM COATED ORAL
Qty: 180 TABLET | Refills: 0 | Status: SHIPPED | OUTPATIENT
Start: 2022-03-28 | End: 2022-07-11

## 2022-04-06 ENCOUNTER — TELEPHONE (OUTPATIENT)
Dept: INTERNAL MEDICINE | Facility: CLINIC | Age: 87
End: 2022-04-06

## 2022-04-06 NOTE — TELEPHONE ENCOUNTER
Caller: MELANIE ELLISON    Relationship: Emergency Contact    Best call back number: [phone]    Requested Prescriptions: HYDROcodone-acetaminophen (NORCO) 5-325 MG per tablet  Requested Prescriptions      No prescriptions requested or ordered in this encounter        [facility]      [address] [facility] [phone]  - [phone] FX  [phone]    Additional details provided by patient: RIGHT AT 3 DAYS LEFT    Does the patient have less than a 3 day supply:  [x] Yes  [] No    Dayton Fraser Rep   04/06/22 12:03 EDT

## 2022-04-08 RX ORDER — HYDROCODONE BITARTRATE AND ACETAMINOPHEN 5; 325 MG/1; MG/1
1 TABLET ORAL DAILY
Qty: 30 TABLET | Refills: 0 | Status: SHIPPED | OUTPATIENT
Start: 2022-04-08 | End: 2022-05-05 | Stop reason: SDUPTHER

## 2022-04-08 NOTE — TELEPHONE ENCOUNTER
Fredrick reviewed. Last filled 2/22/22. Medication refilled as requested.   No UDS on file.     Will have office staff look to see if she has a contract on file.     Jeanine De La Paz MD

## 2022-04-08 NOTE — TELEPHONE ENCOUNTER
Refill request for  controlled substance.      Date of request: 04/06/2022  Medication requested: Adderrall and Norco   Last OV: 01/28/2022  Last UDS: None  Contract signed: no    Date:None  Next office visit: 04/29/2022    Reji Caraballo MA

## 2022-04-08 NOTE — TELEPHONE ENCOUNTER
Patient's daughter is calling for her mother's hydrocodone to refilled ASAP. Medication pended and sent to provider for approval.

## 2022-04-08 NOTE — TELEPHONE ENCOUNTER
Caller: MELANIE ELLISON    Relationship: Emergency Contact    Best call back number: [phone]    What is the best time to reach you: ANYTIME    Who are you requesting to speak with (clinical staff, provider,  specific staff member): [facility]        What was the call regarding: DAUGHTER CALLED TO CHECK STATUS OF REFILL. MOTHER WILL BE OUT. NEEDS ASAP    Do you require a callback: YES ONCE THE REFILL IS SENT TO PHARMACY

## 2022-04-12 RX ORDER — CHLORTHALIDONE 25 MG/1
25 TABLET ORAL DAILY
Qty: 90 TABLET | Refills: 0 | Status: SHIPPED | OUTPATIENT
Start: 2022-04-12 | End: 2022-08-01

## 2022-04-12 NOTE — TELEPHONE ENCOUNTER
Caller: TERRAMELANIE    Relationship: Emergency Contact    Best call back number: [phone]     Requested Prescriptions:   Requested Prescriptions     Pending Prescriptions Disp Refills   • chlorthalidone (HYGROTON) 25 MG tablet 30 tablet 2     Sig: Take 1 tablet by mouth Daily.        Pharmacy where request should be sent: [address] - [phone]  - [phone] FX     Additional details provided by patient: PATIENT REQUESTING 90 DAY SUPPLY    Does the patient have less than a 3 day supply:  [x] Yes  [] No    Dayton BOWSER Rep   04/12/22 10:05 EDT

## 2022-04-14 NOTE — TELEPHONE ENCOUNTER
"Prescription was sent on 4/8/22, although it does not appear as though it has been filled as of yet.     Can we find out if this pt was able to  their prescription?  Status is \"Receipt confirmed by pharmacy in [facility]\"    Thank You     Jeanine De La Paz MD         **Of note request is for norco alone. Patient is not on adderall.   "

## 2022-04-29 ENCOUNTER — OFFICE VISIT (OUTPATIENT)
Dept: INTERNAL MEDICINE | Facility: CLINIC | Age: 87
End: 2022-04-29

## 2022-04-29 VITALS
DIASTOLIC BLOOD PRESSURE: 80 MMHG | OXYGEN SATURATION: 90 % | WEIGHT: 214 LBS | SYSTOLIC BLOOD PRESSURE: 122 MMHG | HEART RATE: 87 BPM | HEIGHT: 62 IN | BODY MASS INDEX: 39.38 KG/M2

## 2022-04-29 DIAGNOSIS — F02.80 FRONTOTEMPORAL DEMENTIA: ICD-10-CM

## 2022-04-29 DIAGNOSIS — G31.09 FRONTOTEMPORAL DEMENTIA: ICD-10-CM

## 2022-04-29 DIAGNOSIS — R06.00 DYSPNEA, UNSPECIFIED TYPE: ICD-10-CM

## 2022-04-29 DIAGNOSIS — Z51.81 ENCOUNTER FOR THERAPEUTIC DRUG LEVEL MONITORING: ICD-10-CM

## 2022-04-29 DIAGNOSIS — I10 ESSENTIAL HYPERTENSION: ICD-10-CM

## 2022-04-29 DIAGNOSIS — M19.90 ARTHRITIS: ICD-10-CM

## 2022-04-29 DIAGNOSIS — F02.818 DEMENTIA ASSOCIATED WITH OTHER UNDERLYING DISEASE WITH BEHAVIORAL DISTURBANCE: Primary | ICD-10-CM

## 2022-04-29 LAB
ALBUMIN SERPL-MCNC: 4.3 G/DL (ref 3.5–5.2)
ALBUMIN/GLOB SERPL: 1.3 G/DL
ALP SERPL-CCNC: 49 U/L (ref 39–117)
ALT SERPL W P-5'-P-CCNC: 19 U/L (ref 1–33)
ANION GAP SERPL CALCULATED.3IONS-SCNC: 12.7 MMOL/L (ref 5–15)
AST SERPL-CCNC: 30 U/L (ref 1–32)
BASOPHILS # BLD AUTO: 0.06 10*3/MM3 (ref 0–0.2)
BASOPHILS NFR BLD AUTO: 0.8 % (ref 0–1.5)
BILIRUB SERPL-MCNC: 0.6 MG/DL (ref 0–1.2)
BUN SERPL-MCNC: 26 MG/DL (ref 8–23)
BUN/CREAT SERPL: 23.2 (ref 7–25)
CALCIUM SPEC-SCNC: 10.3 MG/DL (ref 8.2–9.6)
CHLORIDE SERPL-SCNC: 101 MMOL/L (ref 98–107)
CHOLEST SERPL-MCNC: 187 MG/DL (ref 0–200)
CO2 SERPL-SCNC: 27.3 MMOL/L (ref 22–29)
CREAT SERPL-MCNC: 1.12 MG/DL (ref 0.57–1)
DEPRECATED RDW RBC AUTO: 43.8 FL (ref 37–54)
EGFRCR SERPLBLD CKD-EPI 2021: 46.8 ML/MIN/1.73
EOSINOPHIL # BLD AUTO: 0.11 10*3/MM3 (ref 0–0.4)
EOSINOPHIL NFR BLD AUTO: 1.4 % (ref 0.3–6.2)
ERYTHROCYTE [DISTWIDTH] IN BLOOD BY AUTOMATED COUNT: 13.2 % (ref 12.3–15.4)
GLOBULIN UR ELPH-MCNC: 3.2 GM/DL
GLUCOSE SERPL-MCNC: 97 MG/DL (ref 65–99)
HCT VFR BLD AUTO: 42.5 % (ref 34–46.6)
HDLC SERPL-MCNC: 47 MG/DL (ref 40–60)
HGB BLD-MCNC: 14.5 G/DL (ref 12–15.9)
IMM GRANULOCYTES # BLD AUTO: 0.04 10*3/MM3 (ref 0–0.05)
IMM GRANULOCYTES NFR BLD AUTO: 0.5 % (ref 0–0.5)
LDLC SERPL CALC-MCNC: 104 MG/DL (ref 0–100)
LDLC/HDLC SERPL: 2.1 {RATIO}
LYMPHOCYTES # BLD AUTO: 1.83 10*3/MM3 (ref 0.7–3.1)
LYMPHOCYTES NFR BLD AUTO: 23.6 % (ref 19.6–45.3)
MCH RBC QN AUTO: 31.2 PG (ref 26.6–33)
MCHC RBC AUTO-ENTMCNC: 34.1 G/DL (ref 31.5–35.7)
MCV RBC AUTO: 91.4 FL (ref 79–97)
MONOCYTES # BLD AUTO: 0.58 10*3/MM3 (ref 0.1–0.9)
MONOCYTES NFR BLD AUTO: 7.5 % (ref 5–12)
NEUTROPHILS NFR BLD AUTO: 5.12 10*3/MM3 (ref 1.7–7)
NEUTROPHILS NFR BLD AUTO: 66.2 % (ref 42.7–76)
NRBC BLD AUTO-RTO: 0 /100 WBC (ref 0–0.2)
PLATELET # BLD AUTO: 205 10*3/MM3 (ref 140–450)
PMV BLD AUTO: 11.9 FL (ref 6–12)
POTASSIUM SERPL-SCNC: 4.2 MMOL/L (ref 3.5–5.2)
PROT SERPL-MCNC: 7.5 G/DL (ref 6–8.5)
RBC # BLD AUTO: 4.65 10*6/MM3 (ref 3.77–5.28)
SODIUM SERPL-SCNC: 141 MMOL/L (ref 136–145)
TRIGL SERPL-MCNC: 207 MG/DL (ref 0–150)
TSH SERPL DL<=0.05 MIU/L-ACNC: 1.39 UIU/ML (ref 0.27–4.2)
VLDLC SERPL-MCNC: 36 MG/DL (ref 5–40)
WBC NRBC COR # BLD: 7.74 10*3/MM3 (ref 3.4–10.8)

## 2022-04-29 PROCEDURE — 80061 LIPID PANEL: CPT | Performed by: INTERNAL MEDICINE

## 2022-04-29 PROCEDURE — 80053 COMPREHEN METABOLIC PANEL: CPT | Performed by: INTERNAL MEDICINE

## 2022-04-29 PROCEDURE — 36415 COLL VENOUS BLD VENIPUNCTURE: CPT | Performed by: INTERNAL MEDICINE

## 2022-04-29 PROCEDURE — 99214 OFFICE O/P EST MOD 30 MIN: CPT | Performed by: INTERNAL MEDICINE

## 2022-04-29 PROCEDURE — 84443 ASSAY THYROID STIM HORMONE: CPT | Performed by: INTERNAL MEDICINE

## 2022-04-29 PROCEDURE — 85025 COMPLETE CBC W/AUTO DIFF WBC: CPT | Performed by: INTERNAL MEDICINE

## 2022-04-29 NOTE — PROGRESS NOTES
"Chief Complaint  Dementia    Subjective          Osiris Vila presents to [facility]  History of Present Illness     The patient presents today for a follow-up for dementia, dyspnea and hypertension. She is accompanied by her daughter who contributes to her care.    Dementia  The patient's daughter states she feels the patients overall status has declined since last visit. The patient notes she is still living with her daughter. She states Osiris's activity of daily living are decreasing. She states Osiris is not changing her depends as often. Decrease in self bathing. Decrease in preparing own meals. Decrease in mobility. The daughter states she had difficulties getting her up in the mornings to get ready for the day. She states Osiris is requiring more assistance with activities of daily living. The patient states she does not feel up to getting up and walking much because she gets fatigued. In the clinic today the patient states she takes a shower every other day and uses the \"stairs\" 2 times per day. The daughter notes she has to beg Osiris to come take a shower. The daughter states Osiris has seen neurology and was told her diagnosis was frontal lobe dementia, that he did not mention Alzheimer's. The patients daughter states she is not sure if the Aricept if effective because her memory has declined so much. She states that most of the time Osiris is very confused. She states Osiris's hallucinations have stopped since taking the Aricept and seroquel. The patient's daughter states the Seroquel is very effective for the patient's sleeping habits. The daughter states she has been trying to get Osiris into a nursing care facility.    Dyspnea  The patient's daughter states she complains of back pain often. Additionally she states she doesn't feel well, she is weak, has dyspnea, and she does not remember everything. The daughter states she has noticed Osiris experiences dyspnea " "while ambulating and even putting on her shoes. She experiences dyspnea with any activities of daily living. The patient states \"my body has slowed down\".       Objective   Vital Signs:   /80   Pulse 87   Ht 157.5 cm (62\")   Wt 97.1 kg (214 lb)   SpO2 90%   BMI 39.14 kg/m²     Physical Exam  Vitals reviewed.   Constitutional:       Appearance: Normal appearance. She is well-developed.   HENT:      Head: Normocephalic and atraumatic.      Right Ear: External ear normal.      Left Ear: External ear normal.   Eyes:      Conjunctiva/sclera: Conjunctivae normal.      Pupils: Pupils are equal, round, and reactive to light.   Cardiovascular:      Rate and Rhythm: Normal rate and regular rhythm.      Heart sounds: No murmur heard.    No friction rub. No gallop.   Pulmonary:      Effort: Pulmonary effort is normal.      Breath sounds: Normal breath sounds. No wheezing or rhonchi.   Musculoskeletal:      Comments: Walking with a cane   Skin:     General: Skin is warm and dry.   Neurological:      Mental Status: She is alert and oriented to person, place, and time.   Psychiatric:         Mood and Affect: Affect normal.         Behavior: Behavior normal.         Thought Content: Thought content normal.        Result Review :       Common labs    Common Labsle 7/16/21 7/16/21 7/16/21 12/13/21 12/13/21    1240 1240 1240 1622 1622   Glucose  90   145 (A)   BUN  26 (A)   25 (A)   Creatinine  0.99   1.02 (A)   eGFR Non  Am  53 (A)   51 (A)   Sodium  141   144   Potassium  4.2   3.9   Chloride  102   105   Calcium  9.6   10.3 (A)   Albumin  4.30   4.20   Total Bilirubin  0.6   0.6   Alkaline Phosphatase  47   51   AST (SGOT)  29   22   ALT (SGPT)  20   18   WBC 7.78   9.49    Hemoglobin 14.1   14.0    Hematocrit 42.2   42.4    Platelets 196   212    Total Cholesterol   209 (A)     Triglycerides   222 (A)     HDL Cholesterol   46     LDL Cholesterol    124 (A)     (A) Abnormal value                     Procedures "        Assessment and Plan    Diagnoses and all orders for this visit:    1. 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   -      I explained to the daughter to recognize that if she gives the hydrocodone every day, she could have some more behavioral disturbances. The daughter will monitor this    2. Frontotemporal dementia (HCC)  -     Adult Transthoracic Echo Complete w/ Color, Spectral and Contrast if necessary per protocol; Future  -     Full Pulmonary Function Test With Bronchodilator; Future  -     Comprehensive Metabolic Panel  -     CBC & Differential  -     TSH  -     Lipid Panel    3. Dyspnea, unspecified type  -     Adult Transthoracic Echo Complete w/ Color, Spectral and Contrast if necessary per protocol; Future  -     Full Pulmonary Function Test With Bronchodilator; Future    4. Essential hypertension  -     Comprehensive Metabolic Panel  -     CBC & Differential  -     TSH  -     Lipid Panel  -     The patient will continue current medication regimen.    5. Arthritis  -     POC Urine Drug Screen [facility]  -     Continue hydrocodone as directed.    6. Encounter for therapeutic drug level monitoring   -     POC Urine Drug Screen [facility]    Follow-up in 6 months       Transcribed from ambient dictation for Ave Raya MD by JULIO DE LEON.  04/29/22   14:23 EDT    Patient verbalized consent to the visit recording.        {  }          Follow Up   Return in about 6 months (around 10/29/2022).  Patient was given instructions and counseling regarding her condition or for health maintenance advice. Please see specific information pulled into the AVS if appropriate.

## 2022-05-05 NOTE — TELEPHONE ENCOUNTER
LOV was 04/29/22, LRF was 04/08/22, UDS none on file, brock last ran on 01/28/19 and contract none on file.

## 2022-05-05 NOTE — TELEPHONE ENCOUNTER
Caller: TERRAMELANIE    Relationship: Emergency Contact    Best call back number: [phone]    Requested Prescriptions:   Requested Prescriptions     Pending Prescriptions Disp Refills   • HYDROcodone-acetaminophen (NORCO) 5-325 MG per tablet 30 tablet 0     Sig: Take 1 tablet by mouth Daily.        Pharmacy where request should be sent: [address] - [phone]  - [phone] FX     Additional details provided by patient:    Does the patient have less than a 3 day supply:  [x] Yes  [] No    Dayton Nevarez Rep   05/05/22 16:32 EDT

## 2022-05-07 RX ORDER — HYDROCODONE BITARTRATE AND ACETAMINOPHEN 5; 325 MG/1; MG/1
1 TABLET ORAL DAILY
Qty: 30 TABLET | Refills: 0 | Status: SHIPPED | OUTPATIENT
Start: 2022-05-07 | End: 2022-06-06 | Stop reason: SDUPTHER

## 2022-05-21 ENCOUNTER — PATIENT MESSAGE (OUTPATIENT)
Dept: INTERNAL MEDICINE | Facility: CLINIC | Age: 87
End: 2022-05-21

## 2022-05-25 NOTE — TELEPHONE ENCOUNTER
From: Osiris ALVARES Julito  To: Ave Raya MD  Sent: 5/21/2022 11:56 AM EDT  Subject: Osiris Vila Hydrocodone    I have discussed with Dr. Raya about possibly increasing her pain med. She is having lots of back, knee, and joint pain. I would like to start giving her one hydrocodone in the evenings as well as in the morning so maybe that will help with her waking up in so much pain. Unless there is another medication that will work better for her and keep her calm.  Thank you for your assistance.  Ros Kyle  [phone]

## 2022-06-06 NOTE — TELEPHONE ENCOUNTER
Caller: TERRAMELANIE    Relationship: Emergency Contact    Best call back number: [phone]    Requested Prescriptions:   Requested Prescriptions     Pending Prescriptions Disp Refills   • HYDROcodone-acetaminophen (NORCO) 5-325 MG per tablet 30 tablet 0     Sig: Take 1 tablet by mouth Daily.        Pharmacy where request should be sent: [address] - [phone]  - [phone] FX     Additional details provided by patient: LESS THAN A 3 DAY SUPPLY    Does the patient have less than a 3 day supply:  [x] Yes  [] No    Dayton Noriega Rep   06/06/22 08:10 EDT

## 2022-06-07 RX ORDER — HYDROCODONE BITARTRATE AND ACETAMINOPHEN 5; 325 MG/1; MG/1
1 TABLET ORAL DAILY
Qty: 30 TABLET | Refills: 0 | Status: SHIPPED | OUTPATIENT
Start: 2022-06-07 | End: 2022-07-12 | Stop reason: SDUPTHER

## 2022-06-07 NOTE — TELEPHONE ENCOUNTER
LOV was 04/29/22, LRF was 05/07/22 #30, UDS none on file, brock last ran on 01/28/19 and contract none on file. Patient is due for UDS, brock and contract.

## 2022-06-20 RX ORDER — LISINOPRIL 10 MG/1
TABLET ORAL
Qty: 90 TABLET | Refills: 0 | Status: SHIPPED | OUTPATIENT
Start: 2022-06-20 | End: 2022-09-19

## 2022-07-11 RX ORDER — APIXABAN 5 MG/1
TABLET, FILM COATED ORAL
Qty: 180 TABLET | Refills: 0 | Status: SHIPPED | OUTPATIENT
Start: 2022-07-11 | End: 2022-10-20

## 2022-07-12 ENCOUNTER — CLINICAL SUPPORT (OUTPATIENT)
Dept: INTERNAL MEDICINE | Facility: CLINIC | Age: 87
End: 2022-07-12

## 2022-07-12 DIAGNOSIS — Z51.81 ENCOUNTER FOR THERAPEUTIC DRUG LEVEL MONITORING: Primary | ICD-10-CM

## 2022-07-12 LAB
AMPHET+METHAMPHET UR QL: NEGATIVE
AMPHETAMINE INTERNAL CONTROL: ABNORMAL
AMPHETAMINES UR QL: NEGATIVE
BARBITURATE INTERNAL CONTROL: ABNORMAL
BARBITURATES UR QL SCN: NEGATIVE
BENZODIAZ UR QL SCN: NEGATIVE
BENZODIAZEPINE INTERNAL CONTROL: ABNORMAL
BUPRENORPHINE INTERNAL CONTROL: ABNORMAL
BUPRENORPHINE SERPL-MCNC: NEGATIVE NG/ML
CANNABINOIDS SERPL QL: NEGATIVE
COCAINE INTERNAL CONTROL: ABNORMAL
COCAINE UR QL: NEGATIVE
EXPIRATION DATE: ABNORMAL
Lab: ABNORMAL
MDMA (ECSTASY) INTERNAL CONTROL: ABNORMAL
MDMA UR QL SCN: NEGATIVE
METHADONE INTERNAL CONTROL: ABNORMAL
METHADONE UR QL SCN: NEGATIVE
METHAMPHETAMINE INTERNAL CONTROL: ABNORMAL
OPIATES INTERNAL CONTROL: ABNORMAL
OPIATES UR QL: POSITIVE
OXYCODONE INTERNAL CONTROL: ABNORMAL
OXYCODONE UR QL SCN: NEGATIVE
PCP UR QL SCN: NEGATIVE
PHENCYCLIDINE INTERNAL CONTROL: ABNORMAL
THC INTERNAL CONTROL: ABNORMAL

## 2022-07-12 PROCEDURE — 80305 DRUG TEST PRSMV DIR OPT OBS: CPT | Performed by: INTERNAL MEDICINE

## 2022-07-12 RX ORDER — HYDROCODONE BITARTRATE AND ACETAMINOPHEN 5; 325 MG/1; MG/1
1 TABLET ORAL DAILY
Qty: 30 TABLET | Refills: 0 | Status: SHIPPED | OUTPATIENT
Start: 2022-07-12 | End: 2022-08-15 | Stop reason: SDUPTHER

## 2022-07-12 NOTE — TELEPHONE ENCOUNTER
Called patient's daughter. No answer; left VM for patient informing her that her mother needs to do a UDS before her medication can be sent.     HUB PLEASE ADVISE......  Pre provider she needs to come in for a UDS

## 2022-07-12 NOTE — TELEPHONE ENCOUNTER
Caller: MELANIE ELLISON    Relationship to patient: Emergency Contact    Best call back number: [phone]    Patient is needing: PATIENT'S DAUGHTER IS WANTING TO KNOW HOW CAN SHE GET A HANDICAP TAG FOR HER CAR. WHEN SHE TAKES MOM TO HER APPOINTMENTS. PLEASE CALL AND ADVISE.

## 2022-07-12 NOTE — TELEPHONE ENCOUNTER
Please contact patient/patient's daughter and let them know that she is due for urine drug screen in office before this refill will be sent.

## 2022-07-12 NOTE — TELEPHONE ENCOUNTER
Caller: MELANIE ELLISON    Relationship: Emergency Contact    Best call back number: [phone]     Requested Prescriptions:   Requested Prescriptions     Pending Prescriptions Disp Refills   • HYDROcodone-acetaminophen (NORCO) 5-325 MG per tablet 30 tablet 0     Sig: Take 1 tablet by mouth Daily.        Pharmacy where request should be sent: [address] - [phone]  - [phone] FX     Additional details provided by patient: PATIENT IS NEEDING A REFILL. PLEASE CALL AND ADVISE.     Does the patient have less than a 3 day supply:  [x] Yes  [] No    Dayton Bhatt Rep   07/12/22 08:17 EDT

## 2022-07-12 NOTE — TELEPHONE ENCOUNTER
Red rule verified and correct.    Relayed message from Sakina Tong NP.     Pt will be in this afternoon.    SHU PRINTED AND PLACED ON DR WHITMORE'S DESK

## 2022-07-15 ENCOUNTER — TELEPHONE (OUTPATIENT)
Dept: INTERNAL MEDICINE | Facility: CLINIC | Age: 87
End: 2022-07-15

## 2022-07-15 NOTE — TELEPHONE ENCOUNTER
Caller: MELANIE ELLISON    Relationship: Emergency Contact    Best call back number: [phone]    What form or medical record are you requesting: HANDICAP STICKER FOR VEHICLE     Who is requesting this form or medical record from you: DAUGHTER FOR PATIENT     How would you like to receive the form or medical records (pick-up, mail, fax):  IN THE OFFICE     Timeframe paperwork needed: AD ELLISON IS ON THE  VERBAL.

## 2022-07-20 NOTE — TELEPHONE ENCOUNTER
Would need apt bc no old paper in the system. Tell them to bring the notarized DMV form to their appt so it can be completed. Has to be notarized.

## 2022-07-20 NOTE — TELEPHONE ENCOUNTER
Mrs. Adair Guevara reported to [facility] for scheduled EEG. Upon exam of the patient's scalp it was discovered that the patient had addition hair applied to a hair net and her scalp. After explaining to the patient and her daughter that in order to complete the study cutting the net multiple time would be required. The decision was made to reschedule the patient's study for a time when the was not wearing the hair net. The patient was provided the number to central scheduling. A call was also made to Dr. Parvin Gibson, office to explain what occurred. Spoke with patients daughter regarding making an appointment to bring in the notarized form daughter stated she would not need an appointment.

## 2022-07-20 NOTE — TELEPHONE ENCOUNTER
Spoke with patients daughter Ros who stated that the patients handicap sticker has  and needs a new one. I informed the daughter that Dr. Raya is out of the office. She said that Dr. Raya has done this in the past. I did not see the paperwork in the patients chart. The daughter stated that she needs this due to she is 89 years old and needs a closer parking spot. Can she get the handicap sticker paperwork by a different provider or would she need an appt to do so.

## 2022-07-20 NOTE — TELEPHONE ENCOUNTER
Left voice message to call office back with more information (where this form was originally done due to no documentation from our office).

## 2022-08-01 RX ORDER — CHLORTHALIDONE 25 MG/1
TABLET ORAL
Qty: 90 TABLET | Refills: 0 | Status: SHIPPED | OUTPATIENT
Start: 2022-08-01 | End: 2022-11-08

## 2022-08-15 ENCOUNTER — TELEPHONE (OUTPATIENT)
Dept: INTERNAL MEDICINE | Facility: CLINIC | Age: 87
End: 2022-08-15

## 2022-08-15 DIAGNOSIS — Z51.81 ENCOUNTER FOR THERAPEUTIC DRUG LEVEL MONITORING: ICD-10-CM

## 2022-08-15 NOTE — TELEPHONE ENCOUNTER
Caller: Osiris Vila    Relationship: Self    Best call back number: [phone]    Requested Prescriptions:   Requested Prescriptions     Pending Prescriptions Disp Refills   • HYDROcodone-acetaminophen (NORCO) 5-325 MG per tablet 30 tablet 0     Sig: Take 1 tablet by mouth Daily.        Pharmacy where request should be sent:   [address] [facility] - [phone]  - [phone] FX  [phone]       Does the patient have less than a 3 day supply:  [] Yes  [x] No    Dayton Leon Rep   08/15/22 12:12 EDT

## 2022-08-18 NOTE — TELEPHONE ENCOUNTER
last filled:  7-  last office visit: 4-  follow up scheduled:  8-  last urine drug screen: 7-  narcotic consent: 7-  brock:  7-

## 2022-08-19 ENCOUNTER — OFFICE VISIT (OUTPATIENT)
Dept: INTERNAL MEDICINE | Facility: CLINIC | Age: 87
End: 2022-08-19

## 2022-08-19 VITALS
DIASTOLIC BLOOD PRESSURE: 72 MMHG | BODY MASS INDEX: 41.99 KG/M2 | HEART RATE: 71 BPM | SYSTOLIC BLOOD PRESSURE: 122 MMHG | HEIGHT: 62 IN | TEMPERATURE: 97.7 F | OXYGEN SATURATION: 94 % | WEIGHT: 228.2 LBS

## 2022-08-19 DIAGNOSIS — M25.441 FINGER JOINT SWELLING, RIGHT: Primary | ICD-10-CM
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
## 2022-08-19 DIAGNOSIS — R30.0 DYSURIA: ICD-10-CM

## 2022-08-19 LAB
ALBUMIN SERPL-MCNC: 4.1 G/DL (ref 3.5–5.2)
ALBUMIN/GLOB SERPL: 1.5 G/DL
ALP SERPL-CCNC: 47 U/L (ref 39–117)
ALT SERPL W P-5'-P-CCNC: 19 U/L (ref 1–33)
ANION GAP SERPL CALCULATED.3IONS-SCNC: 9.6 MMOL/L (ref 5–15)
AST SERPL-CCNC: 23 U/L (ref 1–32)
BASOPHILS # BLD AUTO: 0.06 10*3/MM3 (ref 0–0.2)
BASOPHILS NFR BLD AUTO: 0.8 % (ref 0–1.5)
BILIRUB SERPL-MCNC: 0.4 MG/DL (ref 0–1.2)
BUN SERPL-MCNC: 33 MG/DL (ref 8–23)
BUN/CREAT SERPL: 31.1 (ref 7–25)
CALCIUM SPEC-SCNC: 9.9 MG/DL (ref 8.2–9.6)
CHLORIDE SERPL-SCNC: 105 MMOL/L (ref 98–107)
CO2 SERPL-SCNC: 26.4 MMOL/L (ref 22–29)
CREAT SERPL-MCNC: 1.06 MG/DL (ref 0.57–1)
DEPRECATED RDW RBC AUTO: 44.9 FL (ref 37–54)
EGFRCR SERPLBLD CKD-EPI 2021: 50 ML/MIN/1.73
EOSINOPHIL # BLD AUTO: 0.21 10*3/MM3 (ref 0–0.4)
EOSINOPHIL NFR BLD AUTO: 2.9 % (ref 0.3–6.2)
ERYTHROCYTE [DISTWIDTH] IN BLOOD BY AUTOMATED COUNT: 13.3 % (ref 12.3–15.4)
GLOBULIN UR ELPH-MCNC: 2.8 GM/DL
GLUCOSE SERPL-MCNC: 96 MG/DL (ref 65–99)
HCT VFR BLD AUTO: 41.8 % (ref 34–46.6)
HGB BLD-MCNC: 14.1 G/DL (ref 12–15.9)
IMM GRANULOCYTES # BLD AUTO: 0.03 10*3/MM3 (ref 0–0.05)
IMM GRANULOCYTES NFR BLD AUTO: 0.4 % (ref 0–0.5)
LYMPHOCYTES # BLD AUTO: 2.04 10*3/MM3 (ref 0.7–3.1)
LYMPHOCYTES NFR BLD AUTO: 28.2 % (ref 19.6–45.3)
MCH RBC QN AUTO: 30.8 PG (ref 26.6–33)
MCHC RBC AUTO-ENTMCNC: 33.7 G/DL (ref 31.5–35.7)
MCV RBC AUTO: 91.3 FL (ref 79–97)
MONOCYTES # BLD AUTO: 0.57 10*3/MM3 (ref 0.1–0.9)
MONOCYTES NFR BLD AUTO: 7.9 % (ref 5–12)
NEUTROPHILS NFR BLD AUTO: 4.32 10*3/MM3 (ref 1.7–7)
NEUTROPHILS NFR BLD AUTO: 59.8 % (ref 42.7–76)
NRBC BLD AUTO-RTO: 0 /100 WBC (ref 0–0.2)
PLATELET # BLD AUTO: 212 10*3/MM3 (ref 140–450)
PMV BLD AUTO: 11.8 FL (ref 6–12)
POTASSIUM SERPL-SCNC: 4.5 MMOL/L (ref 3.5–5.2)
PROT SERPL-MCNC: 6.9 G/DL (ref 6–8.5)
RBC # BLD AUTO: 4.58 10*6/MM3 (ref 3.77–5.28)
SODIUM SERPL-SCNC: 141 MMOL/L (ref 136–145)
URATE SERPL-MCNC: 8.3 MG/DL (ref 2.4–5.7)
WBC NRBC COR # BLD: 7.23 10*3/MM3 (ref 3.4–10.8)

## 2022-08-19 PROCEDURE — 99213 OFFICE O/P EST LOW 20 MIN: CPT | Performed by: PHYSICIAN ASSISTANT

## 2022-08-19 PROCEDURE — 85025 COMPLETE CBC W/AUTO DIFF WBC: CPT | Performed by: PHYSICIAN ASSISTANT

## 2022-08-19 PROCEDURE — 36415 COLL VENOUS BLD VENIPUNCTURE: CPT | Performed by: PHYSICIAN ASSISTANT

## 2022-08-19 PROCEDURE — 80053 COMPREHEN METABOLIC PANEL: CPT | Performed by: PHYSICIAN ASSISTANT

## 2022-08-19 PROCEDURE — 84550 ASSAY OF BLOOD/URIC ACID: CPT | Performed by: PHYSICIAN ASSISTANT

## 2022-08-19 RX ORDER — HYDROCODONE BITARTRATE AND ACETAMINOPHEN 5; 325 MG/1; MG/1
1 TABLET ORAL DAILY
Qty: 30 TABLET | Refills: 0 | Status: SHIPPED | OUTPATIENT
Start: 2022-08-19 | End: 2022-10-28

## 2022-08-19 RX ORDER — PREDNISONE 20 MG/1
40 TABLET ORAL DAILY
Qty: 10 TABLET | Refills: 0 | Status: SHIPPED | OUTPATIENT
Start: 2022-08-19 | End: 2022-08-24

## 2022-08-19 NOTE — PROGRESS NOTES
Chief Complaint  Swollen finger, fall, uti   Subjective          Osiris Vila presents to [facility]  Swelling in R pointer finger: finger is red, warm to touch   Daughter has been giving ibu at home which has helped with swelling  Also using diclofenac gel     Burning with urinating x 2 wks  Daughter states pt c/o burning a few days then sx go away. She states she will not come in to office to check urine when she is having sx  Pt states she is not having any symptoms today   Slight nausea but no vomiting when she has burning.  Denies frequency, hematuria.   Denies ams     Pt fell out of bed onto the floor when sleeping  Pt states she did not hit head  She states she sat down on the floor  Pt got up from the fall  Pt did not have any bruises   Denies confusion after the fall   Daughter recently added bedrail to her bed        Past Medical History:   Diagnosis Date   • Actinic keratosis 09/20/2016    LESIONS FROZEN TODAY   • Allergic rhinitis    • Arthritis    • Back pain    • Colitis, ulcerative (HCC)    • Condition not found     HERNIA   • Deafness    • GERD (gastroesophageal reflux disease) 09/20/2016    OMEPRAZOLE FOR 4-6 MONTHS OLD, DISCUSSED RISKS   • Hearing aid worn    • Hearing loss    • Hiatal hernia    • Hypertension, essential, benign    • Iron deficiency    • Leg swelling    • Lumbago 06/23/2015    CHRONIC, WILL REFILL MEDS WHEN SHE IS OUT OF THE MEDS SHE GETS FROM [facility] WILL NEED UDS AT THAT TIME COMPLETED CONSENT TODAY   • Seborrheic dermatitis 06/23/2015    WILL CONTINUE TREATMENT WITH KETAKONAZOLE SHAMPOO AND HAVE HER FOLLOW UP WITH DERM   • Sinus trouble    • SOB (shortness of breath)    • Vascular dementia (HCC)    • Vitamin D deficiency         Past Surgical History:   Procedure Laterality Date   • BLADDER SURGERY  1989   • CATARACT EXTRACTION     • CHOLECYSTECTOMY  1973   • COLONOSCOPY  2013   • ENDOSCOPY  2013   • HIATAL HERNIA REPAIR     •  HYSTERECTOMY  1986   • REPLACEMENT TOTAL KNEE Left 2012   • SINUS SURGERY  1993, 1998, 2000        Current Outpatient Medications on File Prior to Visit   Medication Sig Dispense Refill   • Ascorbic Acid (VITAMIN C PO) Vitamin C oral take 1  by oral route daily   Active     • chlorthalidone (HYGROTON) 25 MG tablet Take 1 tablet by mouth once daily 90 tablet 0   • Diclofenac Sodium (Voltaren) 1 % gel gel Apply 4 g topically to the appropriate area as directed 4 (Four) Times a Day As Needed (hand pain). 100 g 1   • donepezil (ARICEPT) 10 MG tablet Take 1 tablet by mouth Every Evening. 30 tablet 3   • Eliquis 5 MG tablet tablet TAKE 1 TABLET BY MOUTH EVERY 12 HOURS 180 tablet 0   • HYDROcodone-acetaminophen (NORCO) 5-325 MG per tablet Take 1 tablet by mouth Daily. 30 tablet 0   • lisinopril (PRINIVIL,ZESTRIL) 10 MG tablet Take 1 tablet by mouth once daily 90 tablet 0   • QUEtiapine (SEROquel) 50 MG tablet One in AM and two in the PM (Patient taking differently: Take 100 mg by mouth 2 (Two) Times a Day. Changed by neurologist) 90 tablet 1   • calcium citrate-vitamin d (CITRACAL) 200-250 MG-UNIT tablet tablet Take 1 tablet by mouth Daily.       No current facility-administered medications on file prior to visit.        Allergies   Allergen Reactions   • Atenolol Unknown - Low Severity   • Bisoprolol Fumarate Unknown - Low Severity   • Cephalexin Unknown - Low Severity   • Clonidine Unknown - Low Severity   • Diltiazem Hcl Unknown - Low Severity   • Doxycycline Hyclate Unknown - Low Severity   • Erythromycin Unknown - Low Severity   • Fosinopril Unknown - Low Severity   • Molds & Smuts Unknown - Low Severity   • Nitrofurantoin Unknown - Low Severity   • Oxybutynin Unknown - Low Severity   • Tetracyclines & Related Unknown - Low Severity   • Triamterene Unknown - Low Severity   • Trimethoprim Unknown - Low Severity   • Verapamil Unknown - Low Severity   • Yeast Unknown - Low Severity       Social History     Tobacco Use  "  Smoking Status Former Smoker   • Packs/day: 0.25   • Years: 1.00   • Pack years: 0.25   • Start date: 1947   • Quit date: 1947   • Years since quittin.7   Smokeless Tobacco Never Used   Tobacco Comment    AMOUNT USED 3-4 AGE START 16 AGE STOP 17          Objective   Vital Signs:   /72 (BP Location: Right arm, Patient Position: Sitting, Cuff Size: Adult)   Pulse 71   Temp 97.7 °F (36.5 °C) (Temporal)   Ht 157.5 cm (62\")   Wt 104 kg (228 lb 3.2 oz)   SpO2 94%   BMI 41.74 kg/m²     Physical Exam  Vitals reviewed.   Constitutional:       Appearance: Normal appearance.   HENT:      Head: Normocephalic and atraumatic.      Nose: Nose normal.      Mouth/Throat:      Mouth: Mucous membranes are moist.   Eyes:      Extraocular Movements: Extraocular movements intact.      Conjunctiva/sclera: Conjunctivae normal.      Pupils: Pupils are equal, round, and reactive to light.   Cardiovascular:      Rate and Rhythm: Normal rate and regular rhythm.   Pulmonary:      Effort: Pulmonary effort is normal.      Breath sounds: Normal breath sounds.   Abdominal:      General: Abdomen is flat. Bowel sounds are normal.      Palpations: Abdomen is soft.   Musculoskeletal:         General: Normal range of motion.      Right hand: Swelling (R pointer finger. redness, slight warmth) and tenderness present. Normal range of motion. There is no disruption of two-point discrimination. Normal capillary refill. Normal pulse.      Left hand: Normal.   Neurological:      General: No focal deficit present.      Mental Status: She is alert and oriented to person, place, and time.   Psychiatric:         Mood and Affect: Mood normal.        Result Review :                 Assessment and Plan    Diagnoses and all orders for this visit:    1. Finger joint swelling, right (Primary)  Assessment & Plan:  Discussed ddx swelling. Will get labs today to eval. Will tx with short course of steroids to help with swelling and pain. Cont " pain meds as rx.    Orders:  -     Uric acid  -     Comprehensive Metabolic Panel  -     CBC & Differential    2. Dysuria  Comments:  UA wnl. encouraged to rtc if sx return  Orders:  -     Urinalysis With Culture If Indicated - Urine, Clean Catch    3. Fall from bed, initial encounter  Comments:  Encouraged to cont use of bed rail. to er if any fall with head trauma/injury or loc    Other orders  -     predniSONE (DELTASONE) 20 MG tablet; Take 2 tablets by mouth Daily for 5 days.  Dispense: 10 tablet; Refill: 0      Follow Up   Return if symptoms worsen or fail to improve, for Next scheduled follow up.  Patient was given instructions and counseling regarding her condition or for health maintenance advice. Please see specific information pulled into the AVS if appropriate.

## 2022-08-19 NOTE — ASSESSMENT & PLAN NOTE
Discussed ddx swelling. Will get labs today to eval. Will tx with short course of steroids to help with swelling and pain. Cont pain meds as rx.

## 2022-08-24 ENCOUNTER — OFFICE VISIT (OUTPATIENT)
Dept: INTERNAL MEDICINE | Facility: CLINIC | Age: 87
End: 2022-08-24

## 2022-08-24 VITALS
OXYGEN SATURATION: 94 % | WEIGHT: 228 LBS | SYSTOLIC BLOOD PRESSURE: 150 MMHG | BODY MASS INDEX: 41.96 KG/M2 | HEART RATE: 75 BPM | DIASTOLIC BLOOD PRESSURE: 86 MMHG | TEMPERATURE: 97.3 F | HEIGHT: 62 IN

## 2022-08-24 DIAGNOSIS — M54.6 CHRONIC MIDLINE THORACIC BACK PAIN: Primary | ICD-10-CM

## 2022-08-24 DIAGNOSIS — G89.29 CHRONIC MIDLINE THORACIC BACK PAIN: Primary | ICD-10-CM

## 2022-08-24 PROCEDURE — 99213 OFFICE O/P EST LOW 20 MIN: CPT | Performed by: NURSE PRACTITIONER

## 2022-08-24 NOTE — PROGRESS NOTES
"Chief Complaint  fall and Back Pain    Subjective        Osiris Vila presents to [facility] for Follow-up for back pain.  Patient is here today with her son, he does bring a letter that was written by his sister who is the primary caretaker for patient.  Later states that she does not feel like the pain in her back is from the fall, she feels like its more chronic in nature.  There were no obvious injuries.  The patient did have a fall a couple weeks ago, it was described more as her to sliding out of the bed because her bedroom was not in place.  Pain is noted to the thoracic/lumbar area.  Midline, does not radiate.  Patient does have Norco, she uses 1 of those daily.  She has diclofenac gel for her hands, she does not use that very regularly.  She was given a short course of steroids last week after the fall for her arthritic type pain.  Son states that the pain is very intermittent, some days she complains about it, some days she has no complaints at all.  Patient does suffer from dementia, so she is unable to give us a true history.  There are no other significant signs or symptoms today.       Objective   Vital Signs:   /86 (BP Location: Left arm)   Pulse 75   Temp 97.3 °F (36.3 °C) (Temporal)   Ht 157.5 cm (62\")   Wt 103 kg (228 lb)   SpO2 94%   BMI 41.70 kg/m²     Physical Exam  Vitals and nursing note reviewed.   Constitutional:       Appearance: Normal appearance.   HENT:      Head: Normocephalic and atraumatic.   Pulmonary:      Effort: Pulmonary effort is normal.   Musculoskeletal:        Arms:       Thoracic back: Tenderness present.   Neurological:      Mental Status: She is alert.   Psychiatric:         Mood and Affect: Mood normal.         Thought Content: Thought content normal.        Result Review :  {The following data was reviewed by SUNDAR Cody on 08/24/22                Diagnoses and all orders for this visit:    1. Chronic midline thoracic " back pain (Primary)  Assessment & Plan:  We will go ahead and do short course of diclofenac twice daily.  We will follow-up if needed.  Does have follow-up scheduled in October with her PCP.      Other orders  -     diclofenac (VOLTAREN) 50 MG EC tablet; Take 1 tablet by mouth 2 (Two) Times a Day.  Dispense: 30 tablet; Refill: 0        Follow Up   No follow-ups on file.  Patient was given instructions and counseling regarding her condition or for health maintenance advice. Please see specific information pulled into the AVS if appropriate.     Saurabh Murphy, APRN  8/24/2022  This note was electronically signed.

## 2022-08-24 NOTE — ASSESSMENT & PLAN NOTE
We will go ahead and do short course of diclofenac twice daily.  We will follow-up if needed.  Does have follow-up scheduled in October with her PCP.

## 2022-08-26 ENCOUNTER — TELEPHONE (OUTPATIENT)
Dept: INTERNAL MEDICINE | Facility: CLINIC | Age: 87
End: 2022-08-26

## 2022-08-26 ENCOUNTER — HOSPITAL ENCOUNTER (OUTPATIENT)
Dept: CARDIOLOGY | Facility: HOSPITAL | Age: 87
Discharge: HOME | End: 2022-08-26

## 2022-08-26 ENCOUNTER — HOSPITAL ENCOUNTER (OUTPATIENT)
Dept: RESPIRATORY THERAPY | Facility: HOSPITAL | Age: 87
Discharge: HOME | End: 2022-08-26

## 2022-08-26 DIAGNOSIS — G31.09 FRONTOTEMPORAL DEMENTIA: ICD-10-CM

## 2022-08-26 DIAGNOSIS — R06.00 DYSPNEA, UNSPECIFIED TYPE: ICD-10-CM

## 2022-08-26 DIAGNOSIS — F02.818 DEMENTIA ASSOCIATED WITH OTHER UNDERLYING DISEASE WITH BEHAVIORAL DISTURBANCE: ICD-10-CM

## 2022-08-26 DIAGNOSIS — F02.80 FRONTOTEMPORAL DEMENTIA: ICD-10-CM

## 2022-08-26 PROCEDURE — 94726 PLETHYSMOGRAPHY LUNG VOLUMES: CPT | Performed by: INTERNAL MEDICINE

## 2022-08-26 PROCEDURE — 93306 TTE W/DOPPLER COMPLETE: CPT | Performed by: INTERNAL MEDICINE

## 2022-08-26 PROCEDURE — 94060 EVALUATION OF WHEEZING: CPT

## 2022-08-26 PROCEDURE — 94060 EVALUATION OF WHEEZING: CPT | Performed by: INTERNAL MEDICINE

## 2022-08-26 PROCEDURE — 93306 TTE W/DOPPLER COMPLETE: CPT

## 2022-08-26 PROCEDURE — 94726 PLETHYSMOGRAPHY LUNG VOLUMES: CPT

## 2022-08-26 RX ORDER — ALBUTEROL SULFATE 2.5 MG/3ML
2.5 SOLUTION RESPIRATORY (INHALATION) ONCE
Status: COMPLETED | OUTPATIENT
Start: 2022-08-26 | End: 2022-08-26

## 2022-08-26 RX ADMIN — ALBUTEROL SULFATE 2.5 MG: 2.5 SOLUTION RESPIRATORY (INHALATION) at 13:52

## 2022-08-26 NOTE — TELEPHONE ENCOUNTER
Called patient's daughter back. She said she has a hard time with her mother due to the dementia. Her mother does not want to do anything. She can not come in the office. I offered her to stop by the office to pick-up some supplies to take home for her mother to collect a urine specimen from her and bring back to us. Daughter was very thankful for the option and will have her mother's aid Callie stop the office to grab it. I informed her to ask for Reggie and I will get her taken care of. Patient's daughter Ros said she has the hardest time with the front staff they will not answer her questions properly, they make like they go back to the back but they don't because they are not back there long, they tell her all the time they can not do this for her because they are not allowed. The daughter stated she has tried a few times to come in and get supplies because she was told to and that is the response she gets everytime. Ros/Callie will be coming to the office for some supplies to assist her mother with a possible UTI. I also informed the daughter of the message from Ashleigh Villeda.

## 2022-08-26 NOTE — TELEPHONE ENCOUNTER
Patient's daughter Ros called in requesting results of her mother's urine. She was wondering why it was taking so long to come back. She stated that it has never taken this long and she is upset because her mother could have a UTI. She never said on the phone of her symptoms she was more concern because she thinks there was a mix-up with her mother's urine. She stated that the young lady that was rooming her just gave her a potty hat and told her to leave a sample and place it on the floor, no name or  was put on it and than another girl came and got it and she does not know what she did with it. She said the other young lady that roomed her mother came back looking for it and she told her it was gone someone else had took it off around the corner. Patient's daughter is not happy about the situation. I informed the patient's daughter that the urine was in process still. I called the lab and the lab said it was collected over in our office but they never received it over there. What can be done about this?

## 2022-08-26 NOTE — TELEPHONE ENCOUNTER
Unfortunately the urine did not make it from our office to the lab at the hospital to be run. Please tell pt we are very sorry for this incontinence. She can stop by the office to repeat a urine sample in the next hour but unfortunately will have to go to  to get further urine testing if unable to get here before we close. I am very sorry, I am not sure why it did not make it to the lab.

## 2022-08-27 LAB
BH CV ECHO MEAS - AO ROOT DIAM: 3.2 CM
BH CV ECHO MEAS - EDV(MOD-SP2): 44 ML
BH CV ECHO MEAS - EDV(MOD-SP4): 44 ML
BH CV ECHO MEAS - EF(MOD-BP): 50 %
BH CV ECHO MEAS - ESV(MOD-SP2): 22 ML
BH CV ECHO MEAS - ESV(MOD-SP4): 22 ML
BH CV ECHO MEAS - IVSD: 0.9 CM
BH CV ECHO MEAS - LA DIMENSION: 3.8 CM
BH CV ECHO MEAS - LAT PEAK E' VEL: 6.4 CM/SEC
BH CV ECHO MEAS - LVIDD: 4.2 CM
BH CV ECHO MEAS - LVIDS: 2.8 CM
BH CV ECHO MEAS - LVOT DIAM: 2 CM
BH CV ECHO MEAS - LVPWD: 1 CM
BH CV ECHO MEAS - MED PEAK E' VEL: 4.68 CM/SEC
BH CV ECHO MEAS - MV A MAX VEL: 102 CM/SEC
BH CV ECHO MEAS - MV DEC TIME: 182 MSEC
BH CV ECHO MEAS - MV E MAX VEL: 108 CM/SEC
BH CV ECHO MEAS - MV E/A: 1.1
BH CV ECHO MEAS - RVDD: 2.9 CM
BH CV ECHO MEAS - TAPSE (>1.6): 2.22 CM
BH CV ECHO MEASUREMENTS AVERAGE E/E' RATIO: 19.49
IVRT: 63 MSEC
LEFT ATRIUM VOLUME INDEX: 11.1 ML/M2
MAXIMAL PREDICTED HEART RATE: 130 BPM
STRESS TARGET HR: 111 BPM

## 2022-09-07 ENCOUNTER — TELEPHONE (OUTPATIENT)
Dept: INTERNAL MEDICINE | Facility: CLINIC | Age: 87
End: 2022-09-07

## 2022-09-14 NOTE — TELEPHONE ENCOUNTER
PT REQUESTING DICLOFENAC REFILL.    Requested Prescriptions     Pending Prescriptions Disp Refills   • diclofenac (VOLTAREN) 50 MG EC tablet 30 tablet 0     Sig: Take 1 tablet by mouth 2 (Two) Times a Day.             8/19/22 4/29/22  eGFR   >60.0 mL/min/1.73 50.0 Low   46.8 Low  CM       LOV - 8/24/22 -  Chronic midline thoracic back pain    LRF - 8/24/22, #30, 0 RF  UPCOMING APPT - 10/28/22

## 2022-09-14 NOTE — TELEPHONE ENCOUNTER
Pt daughter called wanting to speak to a nurse about pts medication. They requested a week ago and it hasn't been filled yet. Pt mother would like a call back ASAP to see why medication hasn't been filled.

## 2022-09-15 NOTE — TELEPHONE ENCOUNTER
Red rule verified and correct.    Daughter notified med was sent; will need to f/u with PCP regarding continuing. Note placed on next appt note.

## 2022-09-15 NOTE — TELEPHONE ENCOUNTER
Red rule verified and correct.    Spoke with daughter and explained why we were waiting; (checking on GFR/NSAID concern)    Will call her back after getting with provider.

## 2022-09-15 NOTE — TELEPHONE ENCOUNTER
Caller: TERRAMELANIE    Relationship: Emergency Contact    Best call back number: [phone]    Requested Prescriptions:   Requested Prescriptions     Pending Prescriptions Disp Refills   • diclofenac (VOLTAREN) 50 MG EC tablet 30 tablet 0     Sig: Take 1 tablet by mouth 2 (Two) Times a Day.        Pharmacy where request should be sent: [address] - [phone]  - [phone] FX       Does the patient have less than a 3 day supply:  [x] Yes  [] No    Dayton Champagne Rep   09/07/22 09:24 EDT           
  Caller: MELANIE ELLISON    Relationship: Emergency Contact    Best call back number: [phone]     Requested Prescriptions:   Requested Prescriptions     Pending Prescriptions Disp Refills   • diclofenac (VOLTAREN) 50 MG EC tablet 30 tablet 0     Sig: Take 1 tablet by mouth 2 (Two) Times a Day.        Pharmacy where request should be sent: [address] - [phone]  - [phone] FX     Additional details provided by patient: OUT OF MEDICATION       Does the patient have less than a 3 day supply:  [x] Yes  [] No    Dayton Gibbons Rep   09/12/22 14:33 EDT         PLEASE CALL PATIENT DAUGHTER BACK ON MEDICATION REFILL STATUS       
Please advise refills   
Sent in another encounter.  
no

## 2022-09-16 ENCOUNTER — TELEPHONE (OUTPATIENT)
Dept: INTERNAL MEDICINE | Facility: CLINIC | Age: 87
End: 2022-09-16

## 2022-09-16 DIAGNOSIS — R35.0 FREQUENCY OF URINATION: Primary | ICD-10-CM

## 2022-09-16 LAB
BILIRUB UR QL STRIP: NEGATIVE
CLARITY UR: CLEAR
COLOR UR: YELLOW
GLUCOSE UR STRIP-MCNC: NEGATIVE MG/DL
HGB UR QL STRIP.AUTO: NEGATIVE
KETONES UR QL STRIP: NEGATIVE
LEUKOCYTE ESTERASE UR QL STRIP.AUTO: NEGATIVE
NITRITE UR QL STRIP: NEGATIVE
PH UR STRIP.AUTO: <=5 [PH] (ref 5–8)
PROT UR QL STRIP: NEGATIVE
SP GR UR STRIP: 1.02 (ref 1–1.03)
UROBILINOGEN UR QL STRIP: NORMAL

## 2022-09-16 PROCEDURE — 87086 URINE CULTURE/COLONY COUNT: CPT | Performed by: INTERNAL MEDICINE

## 2022-09-16 PROCEDURE — 81003 URINALYSIS AUTO W/O SCOPE: CPT | Performed by: INTERNAL MEDICINE

## 2022-09-16 NOTE — TELEPHONE ENCOUNTER
Caller: MELANIE ELLISON    Relationship: Emergency Contact    Best call back number: [phone]    What is the best time to reach you: ANYTIME    Who are you requesting to speak with (clinical staff, provider,  specific staff member): CLINICAL    Do you know the name of the person who called: ODILON    What was the call regarding: NOT SURE    Do you require a callback: YES

## 2022-09-17 LAB — BACTERIA SPEC AEROBE CULT: NO GROWTH

## 2022-09-19 RX ORDER — LISINOPRIL 10 MG/1
TABLET ORAL
Qty: 90 TABLET | Refills: 1 | Status: SHIPPED | OUTPATIENT
Start: 2022-09-19

## 2022-09-20 NOTE — TELEPHONE ENCOUNTER
Left message for Ros (daughter) to call the office back.  Unsure of why the office was calling her but there is a results note.    Dr. Raya: Urine Culture  Urine normal.  If having symptoms or issues please let us know so we can address further.

## 2022-09-27 DIAGNOSIS — I50.32 CHRONIC HEART FAILURE WITH PRESERVED EJECTION FRACTION: Primary | ICD-10-CM

## 2022-10-03 ENCOUNTER — TELEPHONE (OUTPATIENT)
Dept: INTERNAL MEDICINE | Facility: CLINIC | Age: 87
End: 2022-10-03

## 2022-10-03 RX ORDER — DAPAGLIFLOZIN 5 MG/1
5 TABLET, FILM COATED ORAL DAILY
Qty: 30 TABLET | Refills: 2 | Status: SHIPPED | OUTPATIENT
Start: 2022-10-03 | End: 2022-11-09 | Stop reason: ALTCHOICE

## 2022-10-03 NOTE — TELEPHONE ENCOUNTER
"Red rule verified and correct.    Pt's daughter calling stating there has not been a medication sent to the pharmacy yet for the \"new heart medicine\".    Unable to find any notes regarding this change.  Will send to provider.  "

## 2022-10-20 RX ORDER — APIXABAN 5 MG/1
TABLET, FILM COATED ORAL
Qty: 180 TABLET | Refills: 0 | Status: SHIPPED | OUTPATIENT
Start: 2022-10-20

## 2022-10-28 ENCOUNTER — OFFICE VISIT (OUTPATIENT)
Dept: INTERNAL MEDICINE | Facility: CLINIC | Age: 87
End: 2022-10-28

## 2022-10-28 VITALS
TEMPERATURE: 98.4 F | HEART RATE: 84 BPM | WEIGHT: 228.6 LBS | BODY MASS INDEX: 42.07 KG/M2 | HEIGHT: 62 IN | OXYGEN SATURATION: 98 % | DIASTOLIC BLOOD PRESSURE: 68 MMHG | SYSTOLIC BLOOD PRESSURE: 128 MMHG

## 2022-10-28 DIAGNOSIS — M25.441 FINGER JOINT SWELLING, RIGHT: ICD-10-CM

## 2022-10-28 DIAGNOSIS — F02.818 DEMENTIA ASSOCIATED WITH OTHER UNDERLYING DISEASE WITH BEHAVIORAL DISTURBANCE: ICD-10-CM

## 2022-10-28 DIAGNOSIS — I10 ESSENTIAL HYPERTENSION: Primary | ICD-10-CM

## 2022-10-28 DIAGNOSIS — H91.90 DEAFNESS, UNSPECIFIED LATERALITY: ICD-10-CM

## 2022-10-28 DIAGNOSIS — I50.32 CHRONIC DIASTOLIC (CONGESTIVE) HEART FAILURE: ICD-10-CM

## 2022-10-28 DIAGNOSIS — R06.09 DYSPNEA ON EXERTION: ICD-10-CM

## 2022-10-28 PROBLEM — I50.31 ACUTE DIASTOLIC CHF (CONGESTIVE HEART FAILURE) (HCC): Status: RESOLVED | Noted: 2022-10-28 | Resolved: 2022-10-28

## 2022-10-28 PROBLEM — I50.31 ACUTE DIASTOLIC CHF (CONGESTIVE HEART FAILURE) (HCC): Status: ACTIVE | Noted: 2022-10-28

## 2022-10-28 PROCEDURE — 84443 ASSAY THYROID STIM HORMONE: CPT | Performed by: INTERNAL MEDICINE

## 2022-10-28 PROCEDURE — 85025 COMPLETE CBC W/AUTO DIFF WBC: CPT | Performed by: INTERNAL MEDICINE

## 2022-10-28 PROCEDURE — 80053 COMPREHEN METABOLIC PANEL: CPT | Performed by: INTERNAL MEDICINE

## 2022-10-28 PROCEDURE — 1170F FXNL STATUS ASSESSED: CPT | Performed by: INTERNAL MEDICINE

## 2022-10-28 PROCEDURE — G0439 PPPS, SUBSEQ VISIT: HCPCS | Performed by: INTERNAL MEDICINE

## 2022-10-28 PROCEDURE — 80061 LIPID PANEL: CPT | Performed by: INTERNAL MEDICINE

## 2022-10-28 PROCEDURE — 99213 OFFICE O/P EST LOW 20 MIN: CPT | Performed by: INTERNAL MEDICINE

## 2022-10-28 PROCEDURE — 1159F MED LIST DOCD IN RCRD: CPT | Performed by: INTERNAL MEDICINE

## 2022-10-28 RX ORDER — QUETIAPINE FUMARATE 100 MG/1
100 TABLET, FILM COATED ORAL 2 TIMES DAILY
COMMUNITY
Start: 2022-10-09

## 2022-10-28 NOTE — ASSESSMENT & PLAN NOTE
-Frontal lobe dementia, progressing.   -Due to patient's worsening decline, it would be best for patient's safety and health to be in a nursing home where constant care can be provided.

## 2022-10-28 NOTE — ASSESSMENT & PLAN NOTE
-On farxiga, noticed improvement in blood pressure.  -Will monitor dyspnea, if no improvement can discuss other treatment options

## 2022-10-28 NOTE — PROGRESS NOTES
Chief Complaint  follow up (Continue Voltaren), Dementia, Depression, Hypertension, and Back Pain    Subjective       Osiris Vila presents to [facility]    HPI     Dementia: Daughter notes there has been major decline in patient's overall status since the last visit. Daughter has noticed Osiris is sleeping more, increased weakness, decrease activity, decrease in appetite and bathing. Patient is showering every 2 days. Patient is no longer able to prepare her own meals, patient has an aide that comes to cook meals and help with self care for Mrs. Vila. However, with decrease appetite patient has declined meals from aide. Patient seems to have trouble swallowing her medication, experiencing increased choking. Therefore, discontinued calcium tablets, as they were too big in size. Patient is starting to demonstrate increased grief when it is time to come to the doctors office. Due to her family work schedule it is difficult to care for her at the moment.     Headaches- Patient is complaining of increased headaches, resolves with ibuprofen.     Dyspnea- Increased dyspnea with exertion. Patient is short of air with short distances, today in the office patient was short of air walking from bathroom to room. Resolves with rest.    Finger Joint swelling- Labs showed increase uric acid levels, suggesting gout. Resolved, no complaints.     Hearing loss- Increased hearing loss, patient has tried hearing aides in the pass    Patient denies chest pain, melena, hematuria, fever, sputum production or hematochezia     Daughter has found a nursing home that has potential availability for patient.  I agree that for safety purposes and good self care it is in her best interest to be cared for at the nursing home.    Patient has received Flu shot and COVID 4th dose.       Objective     Vitals:    10/28/22 1127   BP: 128/68   BP Location: Left arm   Patient Position: Sitting   Cuff  "Size: Adult   Pulse: 84   Temp: 98.4 °F (36.9 °C)   TempSrc: Temporal   SpO2: 98%   Weight: 104 kg (228 lb 9.6 oz)   Height: 157.5 cm (62\")      Wt Readings from Last 3 Encounters:   10/28/22 104 kg (228 lb 9.6 oz)   08/24/22 103 kg (228 lb)   08/19/22 104 kg (228 lb 3.2 oz)      BP Readings from Last 3 Encounters:   10/28/22 128/68   08/24/22 150/86   08/19/22 122/72        Body mass index is 41.81 kg/m².           Physical Exam  Constitutional:       Appearance: Normal appearance.   HENT:      Head: Normocephalic and atraumatic.      Right Ear: Tympanic membrane normal.      Left Ear: Tympanic membrane normal.      Mouth/Throat:      Mouth: Mucous membranes are moist.      Pharynx: Oropharynx is clear. No oropharyngeal exudate.   Eyes:      Conjunctiva/sclera: Conjunctivae normal.   Cardiovascular:      Rate and Rhythm: Normal rate and regular rhythm.      Pulses: Normal pulses.      Heart sounds: Normal heart sounds. No murmur heard.    No gallop.   Pulmonary:      Effort: Pulmonary effort is normal.      Breath sounds: Normal breath sounds.   Abdominal:      General: Abdomen is flat. There is no distension.      Palpations: Abdomen is soft.      Tenderness: There is no abdominal tenderness.   Musculoskeletal:      Cervical back: Neck supple. No tenderness.      Left lower leg: Edema (1+) present.   Lymphadenopathy:      Cervical: No cervical adenopathy.   Skin:     General: Skin is warm and dry.   Neurological:      Mental Status: She is alert. Mental status is at baseline.      Comments: Patient confused about her age and birthday, thinks today is her birthday. Believed she was turning 61, and once corrected, she noted to be turning 89 years of age    Psychiatric:         Mood and Affect: Mood normal.          Result Review :   The following data was reviewed by: Ave Raya MD on 10/28/2022:        Procedures    Assessment and Plan   Diagnoses and all orders for this visit:    1. Essential hypertension " (Primary)  Assessment & Plan:  -Stable, continue current management     Orders:  -     Comprehensive Metabolic Panel  -     CBC & Differential  -     TSH  -     Lipid Panel    2. Finger joint swelling, right  Assessment & Plan:  -Possibly gout based on increased uric acid levels. Resolved, will monitor       3. Dementia associated with other underlying disease with behavioral disturbance  Assessment & Plan:  -Frontal lobe dementia, progressing.   -Due to patient's worsening decline, it would be best for patient's safety and health to be in a nursing home where constant care can be provided.       4. Dyspnea on exertion  Assessment & Plan:  -Heart ECHO showed impaired relaxation of left ventricle with an ejection fraction of 50%   -Dyspnea most likely secondary to diastolic dysfunction.       5. Chronic diastolic (congestive) heart failure (HCC)  Assessment & Plan:  -On farxiga, noticed improvement in blood pressure.  -Will monitor dyspnea, if no improvement can discuss other treatment options     Orders:  -     Comprehensive Metabolic Panel  -     CBC & Differential  -     TSH  -     Lipid Panel    6. Deafness, unspecified laterality  Assessment & Plan:  -Has been evaluated in the pass,  not successful with hearing aides, patient did not tolerate well.             Follow Up   No follow-ups on file.  Patient was given instructions and counseling regarding her condition or for health maintenance advice. Please see specific information pulled into the AVS if appropriate.

## 2022-10-28 NOTE — PROGRESS NOTES
The ABCs of the Annual Wellness Visit  Subsequent Medicare Wellness Visit    Chief Complaint   Patient presents with   • follow up     Continue Voltaren   • Dementia   • Depression   • Hypertension   • Back Pain      Subjective    History of Present Illness:  Osiris Vila is a 89 y.o. female who presents for a Subsequent Medicare Wellness Visit.    The following portions of the patient's history were reviewed and   updated as appropriate: allergies, current medications, past family history, past medical history, past social history, past surgical history and problem list.    Compared to one year ago, the patient feels her physical   health is worse.    Compared to one year ago, the patient feels her mental   health is worse.    Recent Hospitalizations:  She was not admitted to the hospital during the last year.       Current Medical Providers:  Patient Care Team:  Ave Raya MD as PCP - General  Ave Raya MD as PCP - Family Medicine    Outpatient Medications Prior to Visit   Medication Sig Dispense Refill   • Ascorbic Acid (VITAMIN C PO) Vitamin C oral take 1  by oral route daily   Active     • chlorthalidone (HYGROTON) 25 MG tablet Take 1 tablet by mouth once daily 90 tablet 0   • dapagliflozin (Farxiga) 5 MG tablet tablet Take 1 tablet by mouth Daily. 30 tablet 2   • diclofenac (VOLTAREN) 50 MG EC tablet Take 1 tablet by mouth 2 (Two) Times a Day. 60 tablet 1   • Diclofenac Sodium (Voltaren) 1 % gel gel Apply 4 g topically to the appropriate area as directed 4 (Four) Times a Day As Needed (hand pain). 100 g 1   • donepezil (ARICEPT) 10 MG tablet Take 1 tablet by mouth Every Evening. 30 tablet 3   • Eliquis 5 MG tablet tablet TAKE 1 TABLET BY MOUTH EVERY 12 HOURS 180 tablet 0   • lisinopril (PRINIVIL,ZESTRIL) 10 MG tablet Take 1 tablet by mouth once daily 90 tablet 1   • QUEtiapine (SEROquel) 100 MG tablet Take 1 tablet by mouth 2 (Two) Times a Day.     • calcium citrate-vitamin d (CITRACAL)  "200-250 MG-UNIT tablet tablet Take 1 tablet by mouth Daily.     • HYDROcodone-acetaminophen (NORCO) 5-325 MG per tablet Take 1 tablet by mouth Daily. 30 tablet 0   • QUEtiapine (SEROquel) 50 MG tablet One in AM and two in the PM (Patient taking differently: Take 2 tablets by mouth 2 (Two) Times a Day. Changed by neurologist) 90 tablet 1     No facility-administered medications prior to visit.       No opioid medication identified on active medication list. I have reviewed chart for other potential  high risk medication/s and harmful drug interactions in the elderly.          Aspirin is not on active medication list.  Aspirin use is not indicated based on review of current medical condition/s. Risk of harm outweighs potential benefits.  .    Patient Active Problem List   Diagnosis   • Deafness   • Dementia associated with other underlying disease with behavioral disturbance   • Essential hypertension   • Gastroesophageal reflux disease without esophagitis   • Urinary incontinence   • Allergic rhinitis   • Low back pain   • Iron deficiency   • Colitis, ulcerative (HCC)   • Arthritis   • Actinic keratosis   • Hiatal hernia   • Seborrheic dermatitis   • Vitamin D deficiency   • Major depressive disorder, recurrent, moderate (HCC)   • Finger joint swelling, right   • Chronic midline thoracic back pain   • Dyspnea on exertion   • Chronic diastolic (congestive) heart failure (HCC)     Advance Care Planning  Advance Directive is on file.  ACP discussion was held with the patient during this visit. Patient has an advance directive in EMR which is still valid.           Objective    Vitals:    10/28/22 1127   BP: 128/68   BP Location: Left arm   Patient Position: Sitting   Cuff Size: Adult   Pulse: 84   Temp: 98.4 °F (36.9 °C)   TempSrc: Temporal   SpO2: 98%   Weight: 104 kg (228 lb 9.6 oz)   Height: 157.5 cm (62\")     Estimated body mass index is 41.81 kg/m² as calculated from the following:    Height as of this encounter: " "157.5 cm (62\").    Weight as of this encounter: 104 kg (228 lb 9.6 oz).    Class 3 Severe Obesity (BMI >=40). Obesity-related health conditions include the following: lower extremity venous stasis disease. Obesity is worsening. BMI is is above average; no BMI management plan is appropriate. We discussed not going to address at this time.      Does the patient have evidence of cognitive impairment? Yes    Physical Exam            HEALTH RISK ASSESSMENT    Smoking Status:  Social History     Tobacco Use   Smoking Status Former   • Packs/day: 0.25   • Years: 1.00   • Pack years: 0.25   • Types: Cigarettes   • Start date: 1947   • Quit date: 1947   • Years since quittin.9   Smokeless Tobacco Never   Tobacco Comments    AMOUNT USED 3-4 AGE START 16 AGE STOP 17     Alcohol Consumption:  Social History     Substance and Sexual Activity   Alcohol Use Never     Fall Risk Screen:    AMRITA Fall Risk Assessment was completed, and patient is at MODERATE risk for falls. Assessment completed on:10/28/2022    Depression Screening:  PHQ-2/PHQ-9 Depression Screening 10/28/2022   Retired PHQ-9 Total Score -   Retired Total Score -   Little Interest or Pleasure in Doing Things 0-->not at all   Feeling Down, Depressed or Hopeless 0-->not at all   PHQ-9: Brief Depression Severity Measure Score 0       Health Habits and Functional and Cognitive Screening:  Functional & Cognitive Status 10/22/2021   Do you have difficulty preparing food and eating? Yes   Do you have difficulty bathing yourself, getting dressed or grooming yourself? No   Do you have difficulty using the toilet? No   Do you have difficulty moving around from place to place? Yes   Do you have trouble with steps or getting out of a bed or a chair? Yes   Current Diet Unhealthy Diet   Dental Exam Up to date   Eye Exam Up to date   Exercise (times per week) 0 times per week   Current Exercises Include No Regular Exercise   Do you need help using the phone?  No   Do " you need help with transportation? Yes   Do you need help shopping? Yes   Do you need help preparing meals?  Yes   Do you need help with housework?  Yes   Do you need help with laundry? Yes   Do you need help taking your medications? No   Do you need help managing money? No   Do you ever drive or ride in a car without wearing a seat belt? No   Have you felt unusual stress, anger or loneliness in the last month? No   Who do you live with? Child   If you need help, do you have trouble finding someone available to you? No   Have you been bothered in the last four weeks by sexual problems? No   Do you have difficulty concentrating, remembering or making decisions? No       Age-appropriate Screening Schedule:  Refer to the list below for future screening recommendations based on patient's age, sex and/or medical conditions. Orders for these recommended tests are listed in the plan section. The patient has been provided with a written plan.    Health Maintenance   Topic Date Due   • TDAP/TD VACCINES (1 - Tdap) Never done   • ZOSTER VACCINE (1 of 2) Never done   • DXA SCAN  05/04/2019   • INFLUENZA VACCINE  Completed              Assessment & Plan   CMS Preventative Services Quick Reference  Risk Factors Identified During Encounter  Immunizations Discussed/Encouraged (specific Immunizations; Influenza  The above risks/problems have been discussed with the patient.  Follow up actions/plans if indicated are seen below in the Assessment/Plan Section.  Pertinent information has been shared with the patient in the After Visit Summary.    Diagnoses and all orders for this visit:    1. Essential hypertension (Primary)  Assessment & Plan:  -Stable, continue current management     Orders:  -     Comprehensive Metabolic Panel  -     CBC & Differential  -     TSH  -     Lipid Panel    2. Finger joint swelling, right  Assessment & Plan:  -Possibly gout based on increased uric acid levels. Resolved, will monitor       3. Dementia  associated with other underlying disease with behavioral disturbance  Assessment & Plan:  -Frontal lobe dementia, progressing.   -Due to patient's worsening decline, it would be best for patient's safety and health to be in a nursing home where constant care can be provided.       4. Dyspnea on exertion  Assessment & Plan:  -Heart ECHO showed impaired relaxation of left ventricle with an ejection fraction of 50%   -Dyspnea most likely secondary to diastolic dysfunction.       5. Chronic diastolic (congestive) heart failure (HCC)  Assessment & Plan:  -On farxiga, noticed improvement in blood pressure.  -Will monitor dyspnea, if no improvement can discuss other treatment options     Orders:  -     Comprehensive Metabolic Panel  -     CBC & Differential  -     TSH  -     Lipid Panel    6. Deafness, unspecified laterality  Assessment & Plan:  -Has been evaluated in the pass,  not successful with hearing aides, patient did not tolerate well.           Follow Up:   No follow-ups on file.     An After Visit Summary and PPPS were made available to the patient.

## 2022-10-28 NOTE — ASSESSMENT & PLAN NOTE
-Heart ECHO showed impaired relaxation of left ventricle with an ejection fraction of 50%   -Dyspnea most likely secondary to diastolic dysfunction.

## 2022-10-28 NOTE — ASSESSMENT & PLAN NOTE
-Has been evaluated in the pass,  not successful with hearing aides, patient did not tolerate well.

## 2022-10-29 LAB
ALBUMIN SERPL-MCNC: 4.3 G/DL (ref 3.5–5.2)
ALBUMIN/GLOB SERPL: 1.2 G/DL
ALP SERPL-CCNC: 59 U/L (ref 39–117)
ALT SERPL W P-5'-P-CCNC: 21 U/L (ref 1–33)
ANION GAP SERPL CALCULATED.3IONS-SCNC: 11.2 MMOL/L (ref 5–15)
AST SERPL-CCNC: 22 U/L (ref 1–32)
BASOPHILS # BLD AUTO: 0.05 10*3/MM3 (ref 0–0.2)
BASOPHILS NFR BLD AUTO: 0.6 % (ref 0–1.5)
BILIRUB SERPL-MCNC: 0.5 MG/DL (ref 0–1.2)
BUN SERPL-MCNC: 35 MG/DL (ref 8–23)
BUN/CREAT SERPL: 27.6 (ref 7–25)
CALCIUM SPEC-SCNC: 9.8 MG/DL (ref 8.2–9.6)
CHLORIDE SERPL-SCNC: 102 MMOL/L (ref 98–107)
CHOLEST SERPL-MCNC: 206 MG/DL (ref 0–200)
CO2 SERPL-SCNC: 28.8 MMOL/L (ref 22–29)
CREAT SERPL-MCNC: 1.27 MG/DL (ref 0.57–1)
DEPRECATED RDW RBC AUTO: 44.5 FL (ref 37–54)
EGFRCR SERPLBLD CKD-EPI 2021: 40.3 ML/MIN/1.73
EOSINOPHIL # BLD AUTO: 0.16 10*3/MM3 (ref 0–0.4)
EOSINOPHIL NFR BLD AUTO: 2 % (ref 0.3–6.2)
ERYTHROCYTE [DISTWIDTH] IN BLOOD BY AUTOMATED COUNT: 13.3 % (ref 12.3–15.4)
GLOBULIN UR ELPH-MCNC: 3.5 GM/DL
GLUCOSE SERPL-MCNC: 91 MG/DL (ref 65–99)
HCT VFR BLD AUTO: 43.3 % (ref 34–46.6)
HDLC SERPL-MCNC: 46 MG/DL (ref 40–60)
HGB BLD-MCNC: 14.8 G/DL (ref 12–15.9)
IMM GRANULOCYTES # BLD AUTO: 0.06 10*3/MM3 (ref 0–0.05)
IMM GRANULOCYTES NFR BLD AUTO: 0.7 % (ref 0–0.5)
LDLC SERPL CALC-MCNC: 132 MG/DL (ref 0–100)
LDLC/HDLC SERPL: 2.79 {RATIO}
LYMPHOCYTES # BLD AUTO: 1.83 10*3/MM3 (ref 0.7–3.1)
LYMPHOCYTES NFR BLD AUTO: 22.4 % (ref 19.6–45.3)
MCH RBC QN AUTO: 31.2 PG (ref 26.6–33)
MCHC RBC AUTO-ENTMCNC: 34.2 G/DL (ref 31.5–35.7)
MCV RBC AUTO: 91.2 FL (ref 79–97)
MONOCYTES # BLD AUTO: 0.67 10*3/MM3 (ref 0.1–0.9)
MONOCYTES NFR BLD AUTO: 8.2 % (ref 5–12)
NEUTROPHILS NFR BLD AUTO: 5.39 10*3/MM3 (ref 1.7–7)
NEUTROPHILS NFR BLD AUTO: 66.1 % (ref 42.7–76)
NRBC BLD AUTO-RTO: 0 /100 WBC (ref 0–0.2)
PLATELET # BLD AUTO: 220 10*3/MM3 (ref 140–450)
PMV BLD AUTO: 12.6 FL (ref 6–12)
POTASSIUM SERPL-SCNC: 4.7 MMOL/L (ref 3.5–5.2)
PROT SERPL-MCNC: 7.8 G/DL (ref 6–8.5)
RBC # BLD AUTO: 4.75 10*6/MM3 (ref 3.77–5.28)
SODIUM SERPL-SCNC: 142 MMOL/L (ref 136–145)
TRIGL SERPL-MCNC: 159 MG/DL (ref 0–150)
TSH SERPL DL<=0.05 MIU/L-ACNC: 1.96 UIU/ML (ref 0.27–4.2)
VLDLC SERPL-MCNC: 28 MG/DL (ref 5–40)
WBC NRBC COR # BLD: 8.16 10*3/MM3 (ref 3.4–10.8)

## 2022-11-04 ENCOUNTER — TELEPHONE (OUTPATIENT)
Dept: INTERNAL MEDICINE | Facility: CLINIC | Age: 87
End: 2022-11-04

## 2022-11-04 ENCOUNTER — HOSPITAL ENCOUNTER (EMERGENCY)
Facility: HOSPITAL | Age: 87
Discharge: HOME | End: 2022-11-04
Attending: EMERGENCY MEDICINE | Admitting: EMERGENCY MEDICINE

## 2022-11-04 ENCOUNTER — APPOINTMENT (OUTPATIENT)
Dept: GENERAL RADIOLOGY | Facility: HOSPITAL | Age: 87
End: 2022-11-04

## 2022-11-04 VITALS
BODY MASS INDEX: 38.93 KG/M2 | DIASTOLIC BLOOD PRESSURE: 99 MMHG | RESPIRATION RATE: 18 BRPM | SYSTOLIC BLOOD PRESSURE: 142 MMHG | OXYGEN SATURATION: 92 % | TEMPERATURE: 99 F | WEIGHT: 228 LBS | HEIGHT: 64 IN | HEART RATE: 74 BPM

## 2022-11-04 DIAGNOSIS — N28.9 RENAL INSUFFICIENCY: ICD-10-CM

## 2022-11-04 DIAGNOSIS — R06.00 DYSPNEA, UNSPECIFIED TYPE: Primary | ICD-10-CM

## 2022-11-04 DIAGNOSIS — E03.9 HYPOTHYROIDISM, UNSPECIFIED TYPE: ICD-10-CM

## 2022-11-04 DIAGNOSIS — R53.83 OTHER FATIGUE: ICD-10-CM

## 2022-11-04 LAB
ALBUMIN SERPL-MCNC: 4.3 G/DL (ref 3.5–5.2)
ALBUMIN/GLOB SERPL: 1.4 G/DL
ALP SERPL-CCNC: 56 U/L (ref 39–117)
ALT SERPL W P-5'-P-CCNC: 18 U/L (ref 1–33)
ANION GAP SERPL CALCULATED.3IONS-SCNC: 8 MMOL/L (ref 5–15)
AST SERPL-CCNC: 21 U/L (ref 1–32)
BASOPHILS # BLD AUTO: 0.05 10*3/MM3 (ref 0–0.2)
BASOPHILS NFR BLD AUTO: 0.7 % (ref 0–1.5)
BILIRUB SERPL-MCNC: 0.4 MG/DL (ref 0–1.2)
BILIRUB UR QL STRIP: NEGATIVE
BUN SERPL-MCNC: 43 MG/DL (ref 8–23)
BUN/CREAT SERPL: 35 (ref 7–25)
CALCIUM SPEC-SCNC: 9.5 MG/DL (ref 8.2–9.6)
CHLORIDE SERPL-SCNC: 108 MMOL/L (ref 98–107)
CLARITY UR: ABNORMAL
CO2 SERPL-SCNC: 24 MMOL/L (ref 22–29)
COLOR UR: YELLOW
CREAT SERPL-MCNC: 1.23 MG/DL (ref 0.57–1)
DEPRECATED RDW RBC AUTO: 49.5 FL (ref 37–54)
EGFRCR SERPLBLD CKD-EPI 2021: 41.6 ML/MIN/1.73
EOSINOPHIL # BLD AUTO: 0.21 10*3/MM3 (ref 0–0.4)
EOSINOPHIL NFR BLD AUTO: 3 % (ref 0.3–6.2)
ERYTHROCYTE [DISTWIDTH] IN BLOOD BY AUTOMATED COUNT: 14.2 % (ref 12.3–15.4)
FLUAV AG NPH QL: NEGATIVE
FLUBV AG NPH QL IA: NEGATIVE
GLOBULIN UR ELPH-MCNC: 3 GM/DL
GLUCOSE SERPL-MCNC: 120 MG/DL (ref 65–99)
GLUCOSE UR STRIP-MCNC: NEGATIVE MG/DL
HCT VFR BLD AUTO: 42.7 % (ref 34–46.6)
HGB BLD-MCNC: 14.1 G/DL (ref 12–15.9)
HGB UR QL STRIP.AUTO: NEGATIVE
HOLD SPECIMEN: NORMAL
HOLD SPECIMEN: NORMAL
IMM GRANULOCYTES # BLD AUTO: 0.03 10*3/MM3 (ref 0–0.05)
IMM GRANULOCYTES NFR BLD AUTO: 0.4 % (ref 0–0.5)
KETONES UR QL STRIP: NEGATIVE
LEUKOCYTE ESTERASE UR QL STRIP.AUTO: NEGATIVE
LYMPHOCYTES # BLD AUTO: 1.84 10*3/MM3 (ref 0.7–3.1)
LYMPHOCYTES NFR BLD AUTO: 26.5 % (ref 19.6–45.3)
MAGNESIUM SERPL-MCNC: 1.9 MG/DL (ref 1.7–2.3)
MCH RBC QN AUTO: 31.5 PG (ref 26.6–33)
MCHC RBC AUTO-ENTMCNC: 33 G/DL (ref 31.5–35.7)
MCV RBC AUTO: 95.3 FL (ref 79–97)
MONOCYTES # BLD AUTO: 0.52 10*3/MM3 (ref 0.1–0.9)
MONOCYTES NFR BLD AUTO: 7.5 % (ref 5–12)
NEUTROPHILS NFR BLD AUTO: 4.3 10*3/MM3 (ref 1.7–7)
NEUTROPHILS NFR BLD AUTO: 61.9 % (ref 42.7–76)
NITRITE UR QL STRIP: NEGATIVE
NRBC BLD AUTO-RTO: 0 /100 WBC (ref 0–0.2)
NT-PROBNP SERPL-MCNC: 163.6 PG/ML (ref 0–1800)
PH UR STRIP.AUTO: <=5 [PH] (ref 5–8)
PLATELET # BLD AUTO: 175 10*3/MM3 (ref 140–450)
PMV BLD AUTO: 11.3 FL (ref 6–12)
POTASSIUM SERPL-SCNC: 4.5 MMOL/L (ref 3.5–5.2)
PROT SERPL-MCNC: 7.3 G/DL (ref 6–8.5)
PROT UR QL STRIP: NEGATIVE
QT INTERVAL: 408 MS
RBC # BLD AUTO: 4.48 10*6/MM3 (ref 3.77–5.28)
SODIUM SERPL-SCNC: 140 MMOL/L (ref 136–145)
SP GR UR STRIP: 1.02 (ref 1–1.03)
T4 FREE SERPL-MCNC: 0.81 NG/DL (ref 0.93–1.7)
TROPONIN T SERPL-MCNC: 0.01 NG/ML (ref 0–0.03)
TSH SERPL DL<=0.05 MIU/L-ACNC: 1.67 UIU/ML (ref 0.27–4.2)
UROBILINOGEN UR QL STRIP: ABNORMAL
WBC NRBC COR # BLD: 6.95 10*3/MM3 (ref 3.4–10.8)
WHOLE BLOOD HOLD COAG: NORMAL
WHOLE BLOOD HOLD SPECIMEN: NORMAL

## 2022-11-04 PROCEDURE — 85025 COMPLETE CBC W/AUTO DIFF WBC: CPT

## 2022-11-04 PROCEDURE — 80053 COMPREHEN METABOLIC PANEL: CPT

## 2022-11-04 PROCEDURE — 93010 ELECTROCARDIOGRAM REPORT: CPT | Performed by: INTERNAL MEDICINE

## 2022-11-04 PROCEDURE — 84439 ASSAY OF FREE THYROXINE: CPT | Performed by: EMERGENCY MEDICINE

## 2022-11-04 PROCEDURE — 81003 URINALYSIS AUTO W/O SCOPE: CPT | Performed by: EMERGENCY MEDICINE

## 2022-11-04 PROCEDURE — 83880 ASSAY OF NATRIURETIC PEPTIDE: CPT

## 2022-11-04 PROCEDURE — U0004 COV-19 TEST NON-CDC HGH THRU: HCPCS | Performed by: EMERGENCY MEDICINE

## 2022-11-04 PROCEDURE — 84443 ASSAY THYROID STIM HORMONE: CPT | Performed by: EMERGENCY MEDICINE

## 2022-11-04 PROCEDURE — 36415 COLL VENOUS BLD VENIPUNCTURE: CPT

## 2022-11-04 PROCEDURE — 87804 INFLUENZA ASSAY W/OPTIC: CPT | Performed by: EMERGENCY MEDICINE

## 2022-11-04 PROCEDURE — U0005 INFEC AGEN DETEC AMPLI PROBE: HCPCS | Performed by: EMERGENCY MEDICINE

## 2022-11-04 PROCEDURE — 71045 X-RAY EXAM CHEST 1 VIEW: CPT

## 2022-11-04 PROCEDURE — 83735 ASSAY OF MAGNESIUM: CPT | Performed by: EMERGENCY MEDICINE

## 2022-11-04 PROCEDURE — 93005 ELECTROCARDIOGRAM TRACING: CPT

## 2022-11-04 PROCEDURE — 99284 EMERGENCY DEPT VISIT MOD MDM: CPT

## 2022-11-04 PROCEDURE — 84484 ASSAY OF TROPONIN QUANT: CPT

## 2022-11-04 PROCEDURE — 93005 ELECTROCARDIOGRAM TRACING: CPT | Performed by: EMERGENCY MEDICINE

## 2022-11-04 PROCEDURE — C9803 HOPD COVID-19 SPEC COLLECT: HCPCS | Performed by: EMERGENCY MEDICINE

## 2022-11-04 RX ORDER — SODIUM CHLORIDE 0.9 % (FLUSH) 0.9 %
10 SYRINGE (ML) INJECTION AS NEEDED
Status: DISCONTINUED | OUTPATIENT
Start: 2022-11-04 | End: 2022-11-04 | Stop reason: HOSPADM

## 2022-11-04 NOTE — TELEPHONE ENCOUNTER
Osiris is having a really hard time breathing and is not able to eat, drink, or get cleaned up on her own. She is tired and can not do it anymore. Her daughter is taking her to the ER today and she wants to know how to make her mom more comfortable or what she should do.

## 2022-11-04 NOTE — ED PROVIDER NOTES
"Time: 6:27 PM EDT  Arrived by: private car  Chief Complaint: SOB  History provided by: patient and daughter in law  History is limited by: N/A     History of Present Illness:  Patient is a 89 y.o. year old female that presents to the emergency department with SOB. Daughter reports SOB got worse. Pt denies difficulty breathing. Pt reports intermittent weakness and fatigue. Pt denies chest pain, abdominal pain, cough. Pt was put on farxiga several months ago and states its not helping. Her primary care provider put her on it due to abnormal echo. Pt states she has \"not been feeling well\" ever since she was put on farxiga. Pt recently visited PCP on Friday and PCP states they are monitoring the situation. Pt denies dysuria, urinary hesitansy. Daughter reports diarrhea.     HPI    Similar Symptoms Previously: no  Recently seen: no      Patient Care Team  Primary Care Provider: Ave Raya MD    Past Medical History:     Allergies   Allergen Reactions   • Atenolol Unknown - Low Severity   • Bisoprolol Fumarate Unknown - Low Severity   • Cephalexin Unknown - Low Severity   • Clonidine Unknown - Low Severity   • Diltiazem Hcl Unknown - Low Severity   • Doxycycline Hyclate Unknown - Low Severity   • Erythromycin Unknown - Low Severity   • Fosinopril Unknown - Low Severity   • Molds & Smuts Unknown - Low Severity   • Nitrofurantoin Unknown - Low Severity   • Oxybutynin Unknown - Low Severity   • Tetracyclines & Related Unknown - Low Severity   • Triamterene Unknown - Low Severity   • Trimethoprim Unknown - Low Severity   • Verapamil Unknown - Low Severity   • Yeast Unknown - Low Severity     Past Medical History:   Diagnosis Date   • Actinic keratosis 09/20/2016    LESIONS FROZEN TODAY   • Allergic rhinitis    • Arthritis    • Back pain    • Colitis, ulcerative (HCC)    • Condition not found     HERNIA   • Deafness    • Diastolic dysfunction    • GERD (gastroesophageal reflux disease) 09/20/2016    OMEPRAZOLE FOR 4-6 " MONTHS OLD, DISCUSSED RISKS   • Hearing aid worn    • Hearing loss    • Hiatal hernia    • Hypertension, essential, benign    • Iron deficiency    • Leg swelling    • Lumbago 06/23/2015    CHRONIC, WILL REFILL MEDS WHEN SHE IS OUT OF THE MEDS SHE GETS FROM [facility] WILL NEED UDS AT THAT TIME COMPLETED CONSENT TODAY   • Seborrheic dermatitis 06/23/2015    WILL CONTINUE TREATMENT WITH KETAKONAZOLE SHAMPOO AND HAVE HER FOLLOW UP WITH DERM   • Sinus trouble    • SOB (shortness of breath)    • Vascular dementia (HCC)    • Vitamin D deficiency      Past Surgical History:   Procedure Laterality Date   • BLADDER SURGERY  1989   • CATARACT EXTRACTION     • CHOLECYSTECTOMY  1973   • COLONOSCOPY  2013   • ENDOSCOPY  2013   • HIATAL HERNIA REPAIR     • HYSTERECTOMY  1986   • REPLACEMENT TOTAL KNEE Left 2012   • SINUS SURGERY  1993, 1998, 2000     Family History   Problem Relation Age of Onset   • Cancer Mother    • Arthritis Father    • Heart disease Father    • Pancreatic cancer Brother 65   • Diabetes Brother        Home Medications:  Prior to Admission medications    Medication Sig Start Date End Date Taking? Authorizing Provider   Ascorbic Acid (VITAMIN C PO) Vitamin C oral take 1  by oral route daily   Active   Yes Provider, MD Sheela   chlorthalidone (HYGROTON) 25 MG tablet Take 1 tablet by mouth once daily 8/1/22  Yes Ave Raya MD   dapagliflozin (Farxiga) 5 MG tablet tablet Take 1 tablet by mouth Daily. 10/3/22  Yes Ave Raya MD   diclofenac (VOLTAREN) 50 MG EC tablet Take 1 tablet by mouth 2 (Two) Times a Day. 10/3/22  Yes Ave Raya MD   Diclofenac Sodium (Voltaren) 1 % gel gel Apply 4 g topically to the appropriate area as directed 4 (Four) Times a Day As Needed (hand pain). 1/28/22  Yes Ave Raya MD   donepezil (ARICEPT) 10 MG tablet Take 1 tablet by mouth Every Evening. 7/20/21  Yes Meri Tracey APRN   Eliquis 5 MG tablet tablet TAKE 1 TABLET BY MOUTH EVERY 12 HOURS  "10/20/22  Yes Ave Raya MD   lisinopril (PRINIVIL,ZESTRIL) 10 MG tablet Take 1 tablet by mouth once daily 22  Yes Daisy Gillette APRN   QUEtiapine (SEROquel) 100 MG tablet Take 1 tablet by mouth 2 (Two) Times a Day. 10/9/22  Yes Provider, MD Sheela        Social History:   Social History     Tobacco Use   • Smoking status: Former     Packs/day: 0.25     Years: 1.00     Pack years: 0.25     Types: Cigarettes     Start date: 1947     Quit date: 1947     Years since quittin.9   • Smokeless tobacco: Never   • Tobacco comments:     AMOUNT USED 3-4 AGE START 16 AGE STOP 17   Vaping Use   • Vaping Use: Never used   Substance Use Topics   • Alcohol use: Never   • Drug use: Never         Review of Systems:  Review of Systems   Unable to perform ROS: Dementia   Constitutional: Positive for fatigue. Negative for chills, diaphoresis and fever.   HENT: Negative for congestion, postnasal drip, rhinorrhea and sore throat.    Eyes: Negative for photophobia.   Respiratory: Positive for shortness of breath. Negative for cough and chest tightness.    Cardiovascular: Negative for chest pain, palpitations and leg swelling.   Gastrointestinal: Positive for diarrhea. Negative for abdominal pain, nausea and vomiting.   Genitourinary: Negative for difficulty urinating, dysuria, flank pain, frequency, hematuria and urgency.   Musculoskeletal: Negative for neck pain and neck stiffness.   Skin: Negative for pallor and rash.   Neurological: Positive for weakness. Negative for dizziness, syncope, numbness and headaches.   Hematological: Negative for adenopathy. Does not bruise/bleed easily.   Psychiatric/Behavioral: Negative.         Physical Exam:  /99   Pulse 74   Temp 99 °F (37.2 °C) (Oral)   Resp 18   Ht 162.6 cm (64\")   Wt 103 kg (228 lb)   SpO2 92%   BMI 39.14 kg/m²     Physical Exam  Vitals and nursing note reviewed.   Constitutional:       General: She is not in acute distress.     " Appearance: Normal appearance. She is not ill-appearing, toxic-appearing or diaphoretic.   HENT:      Head: Normocephalic and atraumatic.      Mouth/Throat:      Mouth: Mucous membranes are moist.   Eyes:      Pupils: Pupils are equal, round, and reactive to light.   Cardiovascular:      Rate and Rhythm: Normal rate and regular rhythm.      Pulses: Normal pulses.           Carotid pulses are 2+ on the right side and 2+ on the left side.       Radial pulses are 2+ on the right side and 2+ on the left side.        Femoral pulses are 2+ on the right side and 2+ on the left side.       Popliteal pulses are 2+ on the right side and 2+ on the left side.        Dorsalis pedis pulses are 2+ on the right side and 2+ on the left side.        Posterior tibial pulses are 2+ on the right side and 2+ on the left side.      Heart sounds: Normal heart sounds. No murmur heard.  Pulmonary:      Effort: Pulmonary effort is normal. No accessory muscle usage, respiratory distress or retractions.      Breath sounds: Decreased breath sounds (slightly) present. No wheezing, rhonchi or rales.   Abdominal:      General: Abdomen is flat. There is no distension.      Palpations: Abdomen is soft. There is no mass.      Tenderness: There is no abdominal tenderness. There is no right CVA tenderness, left CVA tenderness, guarding or rebound.      Comments: No rigidity   Musculoskeletal:         General: No swelling, tenderness or deformity.      Cervical back: Neck supple. No tenderness.      Right lower leg: No tenderness. No edema.      Left lower leg: No tenderness. No edema.   Skin:     General: Skin is warm and dry.      Capillary Refill: Capillary refill takes less than 2 seconds.      Coloration: Skin is not jaundiced or pale.      Findings: No erythema.   Neurological:      General: No focal deficit present.      Mental Status: She is alert and oriented to person, place, and time. Mental status is at baseline.      Sensory: No sensory  deficit.      Motor: No weakness.   Psychiatric:         Mood and Affect: Mood normal.         Behavior: Behavior normal.                Medications in the Emergency Department:  Medications - No data to display     Labs  Lab Results (last 24 hours)     ** No results found for the last 24 hours. **           Imaging:  No Radiology Exams Resulted Within Past 24 Hours    Procedures:  Procedures    Progress  ED Course as of 11/08/22 0547   Fri Nov 04, 2022   1901 EKG:    Rhythm: Sinus rhythm  Rate: 72  Intervals: Normal OR and QT interval  Right bundle branch block  T-wave: Nonspecific T wave flattening, possible T wave inversion V3, V4, III, aVF  ST Segment: No pathological ST ovation or ST depressions to suggest myocardial injury or myocardial ischemia    EKG Comparison: Unavailable for comparison    Interpreted by me   [SD]      ED Course User Index  [SD] Micheal Carpenter DO                            Medical Decision Making:  MDM  Number of Diagnoses or Management Options  Dyspnea, unspecified type  Hypothyroidism, unspecified type  Other fatigue  Renal insufficiency  Diagnosis management comments:     The patient had stable vital signs and emergency room.  The patient's temperature was 99 degrees.  The patient's CBC was completely normal.  The patient's chemistry demonstrated BUN of 43 and a creatinine of 1.23.  Otherwise electrolytes were normal.  The patient's BNP was normal.  The troponin was normal.  The patient's magnesium was normal.  The patient's TSH was normal but the patient had a decreased free T4 8.81.  The patient's flu is negative.  The patient's COVID is pending at the time of dictation.  The patient will stay quarantined at home until they can review those results with her primary care physician.  The patient's urine was normal.  The patient will be discharged home.  The patient's vital signs are stable at the time of discharge.  The patient and daughter-in-law at bedside feel comfortable for  discharge.  The patient will follow-up with her primary care physician.  The patient will discuss possible need for treatment of the hyperthyroidism.  I will stop the patient's Farxiga over the weekend as the daughter feels the patient's symptoms could be related to this.  The patient and daughter were given very specific instructions on when and why to return to the emergency room.  They both voiced understanding of the reason why to return to the emergency room and felt comfortable for discharge.         Amount and/or Complexity of Data Reviewed  Clinical lab tests: reviewed  Tests in the radiology section of CPT®: reviewed  Tests in the medicine section of CPT®: reviewed  Decide to obtain previous medical records or to obtain history from someone other than the patient: yes                 Final diagnoses:   Dyspnea, unspecified type   Other fatigue   Hypothyroidism, unspecified type   Renal insufficiency        Disposition:  ED Disposition     ED Disposition   Discharge    Condition   Stable    Comment   --             Documentation assistance provided by Trevor Hare acting as scribe for Micheal Carpenter DO. Information recorded by the scribe was done at my direction and has been verified and validated by me.          Trevor Hare  11/04/22 1903       Micheal Carpenter DO  11/08/22 1990

## 2022-11-05 LAB — SARS-COV-2 RNA PNL SPEC NAA+PROBE: NOT DETECTED

## 2022-11-05 NOTE — DISCHARGE INSTRUCTIONS
Please follow-up with your primary care physician on Monday     Please review your thyroid panel that was performed today and discuss possible need for treatment for hypothyroidism    Please hold your Farxiga over the weekend and discussed with Dr. Raya on Monday    Return to the emergency room immediately for altered mental status, headache, vomiting, chest pain, chest pressure, worsening shortness of breath, near passing out, passing out, worsening fatigue, unusual sweating or any new symptoms you are concerned with    Your were tested for COVID-19 today.  Please stay quarantined at home until  you can review your COVID-19 results with your primary care physician and are released from quarantine

## 2022-11-07 ENCOUNTER — TELEPHONE (OUTPATIENT)
Dept: INTERNAL MEDICINE | Facility: CLINIC | Age: 87
End: 2022-11-07

## 2022-11-07 NOTE — TELEPHONE ENCOUNTER
Provider: ELISSA WOODALL  Caller: MELANIE ELLISON  Relationship to Patient: DAUGHTER  [address] [facility] - [phone]  - [phone]   [phone]  Phone Number: [phone]   Reason for Call: PATIENT WENT TO ER AND THEY TOOK HER OFF OF dapagliflozin (Farxiga) 5 MG tablet tablet. BREATHING IS NOT AS HARD NOW.    CALLER DOES NOT WANT TO PUT HER BACK ON FARXIGA.

## 2022-11-08 RX ORDER — CHLORTHALIDONE 25 MG/1
TABLET ORAL
Qty: 90 TABLET | Refills: 0 | Status: SHIPPED | OUTPATIENT
Start: 2022-11-08

## 2022-12-22 ENCOUNTER — READMISSION MANAGEMENT (OUTPATIENT)
Dept: CALL CENTER | Facility: HOSPITAL | Age: 87
End: 2022-12-22

## 2022-12-22 ENCOUNTER — TRANSITIONAL CARE MANAGEMENT TELEPHONE ENCOUNTER (OUTPATIENT)
Dept: CALL CENTER | Facility: HOSPITAL | Age: 87
End: 2022-12-22

## 2022-12-22 NOTE — OUTREACH NOTE
Prep Survey    Flowsheet Row Responses   [facility] facility patient discharged from? Non-BH   Is LACE score < 7 ? Non-BH Discharge   Eligibility [facility]   Date of Discharge 12/21/22   Discharge diagnosis Dementia    Does the patient have one of the following disease processes/diagnoses(primary or secondary)? Other   Prep survey completed? Yes          ARVIN Tinsley Registered Nurse

## 2022-12-22 NOTE — OUTREACH NOTE
Call Center TCM Note    Flowsheet Row Responses   [facility] from? Non[facility]   Does the patient have one of the following disease processes/diagnoses(primary or secondary)? Other   TCM attempt successful? No  [No verbal release]   Unsuccessful attempts Attempt 1          Ynes Lee RN    12/22/2022, 12:40 EST

## 2022-12-22 NOTE — OUTREACH NOTE
Call Center TCM Note    Flowsheet Row Responses   [facility] patient discharged from? Non-BH   Does the patient have one of the following disease processes/diagnoses(primary or secondary)? Other   TCM attempt successful? Yes   Call start time 1624   Change in Health Status Moved to LTC/SNF/Hospice   Call end time 1625   Discharge diagnosis Dementia    Person spoke with today (if not patient) and relationship Ros-daughter    Psychosocial issues? Yes   Psychosocial comments dementia. Pt will f/u with provider at nursing home.    TCM call completed? Yes   Call end time 1625   Would this patient benefit from a Referral to Amb Social Work? No   Is the patient interested in additional calls from an ambulatory ?  NOTE:  applies to high risk patients requiring additional follow-up. No          Ynes Lee RN    12/22/2022, 16:26 EST

## 2023-11-08 ENCOUNTER — HOSPITAL ENCOUNTER (EMERGENCY)
Facility: HOSPITAL | Age: 88
Discharge: HOME | End: 2023-11-09
Attending: EMERGENCY MEDICINE
Payer: MEDICARE

## 2023-11-08 VITALS
SYSTOLIC BLOOD PRESSURE: 156 MMHG | WEIGHT: 231.48 LBS | OXYGEN SATURATION: 92 % | BODY MASS INDEX: 39.52 KG/M2 | TEMPERATURE: 98 F | RESPIRATION RATE: 16 BRPM | DIASTOLIC BLOOD PRESSURE: 71 MMHG | HEIGHT: 64 IN | HEART RATE: 80 BPM

## 2023-11-08 DIAGNOSIS — T14.8XXA PUNCTURE WOUND: Primary | ICD-10-CM

## 2023-11-08 PROCEDURE — 99283 EMERGENCY DEPT VISIT LOW MDM: CPT

## 2023-11-09 NOTE — DISCHARGE INSTRUCTIONS
Keep the pressure dressing on with the Surgicel for 48 hours.  Keep the dressing dry.  Should the dressing bleed through apply another layer of pressure dressing.  Follow-up with your family doctor for further evaluation and treatment.  Return to the emergency department for any acutely developing signs of infection, any persistent bleeding that cannot be controlled with pressure or any new or worse concerns.

## 2023-11-09 NOTE — ED PROVIDER NOTES
Subjective   History of Present Illness  The patient presents to the emergency department and states that she has no idea whatsoever while she is here.  She states that she cut her thumb yesterday and that it has not been bleeding and the bandage has been in place and states that she is not sure why she is even been sent to the emergency department.  I did speak with the nurse that took report from the nursing home that states that she has a puncture wound to her left thumb from 1 week ago states that its been bleeding through the dressing today.  The patient is neurovascular intact.  She does have a dressing on during exam.  She is able to flex and extend her extremity completely, is neurovascular intact.    History provided by:  Patient   used: No        Review of Systems   Constitutional:  Negative for chills and fever.   HENT:  Negative for congestion, ear pain and sore throat.    Eyes:  Negative for pain.   Respiratory:  Negative for cough, chest tightness, shortness of breath and wheezing.    Cardiovascular:  Negative for chest pain.   Gastrointestinal:  Negative for abdominal pain, diarrhea, nausea and vomiting.   Genitourinary:  Negative for dysuria, flank pain, frequency, hematuria and urgency.   Musculoskeletal:  Negative for back pain, joint swelling, neck pain and neck stiffness.   Skin:  Positive for wound. Negative for pallor.   Neurological:  Negative for seizures and headaches.   All other systems reviewed and are negative.      Past Medical History:   Diagnosis Date    Actinic keratosis 09/20/2016    LESIONS FROZEN TODAY    Allergic rhinitis     Arthritis     Back pain     Colitis, ulcerative     Condition not found     HERNIA    Deafness     Diastolic dysfunction     GERD (gastroesophageal reflux disease) 09/20/2016    OMEPRAZOLE FOR 4-6 MONTHS OLD, DISCUSSED RISKS    Hearing aid worn     Hearing loss     Hiatal hernia     Hypertension, essential, benign     Iron deficiency      Leg swelling     Lumbago 2015    CHRONIC, WILL REFILL MEDS WHEN SHE IS OUT OF THE MEDS SHE GETS FROM [facility] WILL NEED UDS AT THAT TIME COMPLETED CONSENT TODAY    Seborrheic dermatitis 2015    WILL CONTINUE TREATMENT WITH KETAKONAZOLE SHAMPOO AND HAVE HER FOLLOW UP WITH DERM    Sinus trouble     SOB (shortness of breath)     Vascular dementia     Vitamin D deficiency        Allergies   Allergen Reactions    Farxiga [Dapagliflozin] Shortness Of Breath    Atenolol Unknown - Low Severity    Bisoprolol Fumarate Unknown - Low Severity    Cephalexin Unknown - Low Severity    Clonidine Unknown - Low Severity    Diltiazem Hcl Unknown - Low Severity    Doxycycline Hyclate Unknown - Low Severity    Erythromycin Unknown - Low Severity    Fosinopril Unknown - Low Severity    Molds & Smuts Unknown - Low Severity    Nitrofurantoin Unknown - Low Severity    Oxybutynin Unknown - Low Severity    Tetracyclines & Related Unknown - Low Severity    Triamterene Unknown - Low Severity    Trimethoprim Unknown - Low Severity    Verapamil Unknown - Low Severity    Yeast Unknown - Low Severity       Past Surgical History:   Procedure Laterality Date    BLADDER SURGERY      CATARACT EXTRACTION      CHOLECYSTECTOMY  1973    COLONOSCOPY  2013    ENDOSCOPY  2013    HIATAL HERNIA REPAIR      HYSTERECTOMY  1986    REPLACEMENT TOTAL KNEE Left 2012    SINUS SURGERY  , ,        Family History   Problem Relation Age of Onset    Cancer Mother     Arthritis Father     Heart disease Father     Pancreatic cancer Brother 65    Diabetes Brother        Social History     Socioeconomic History    Marital status:    Tobacco Use    Smoking status: Former     Packs/day: 0.25     Years: 1.00     Additional pack years: 0.00     Total pack years: 0.25     Types: Cigarettes     Start date: 1947     Quit date: 1947     Years since quittin.9    Smokeless tobacco: Never    Tobacco comments:     AMOUNT USED 3-4 AGE START 16  AGE STOP 17   Vaping Use    Vaping Use: Never used   Substance and Sexual Activity    Alcohol use: Never    Drug use: Never    Sexual activity: Defer           Objective   Physical Exam  Vitals and nursing note reviewed.   Constitutional:       General: She is not in acute distress.     Appearance: Normal appearance. She is not ill-appearing or toxic-appearing.   HENT:      Head: Normocephalic and atraumatic.   Eyes:      General: No scleral icterus.     Conjunctiva/sclera: Conjunctivae normal.      Pupils: Pupils are equal, round, and reactive to light.   Cardiovascular:      Rate and Rhythm: Normal rate and regular rhythm.      Pulses: Normal pulses.   Pulmonary:      Effort: Pulmonary effort is normal. No respiratory distress.   Musculoskeletal:         General: Tenderness present. No swelling. Normal range of motion.      Cervical back: Normal range of motion.   Skin:     General: Skin is warm and dry.      Capillary Refill: Capillary refill takes less than 2 seconds.      Findings: Lesion present. No bruising, erythema or rash.   Neurological:      General: No focal deficit present.      Mental Status: She is alert and oriented to person, place, and time. Mental status is at baseline.   Psychiatric:         Mood and Affect: Mood normal.         Behavior: Behavior normal.         Procedures           ED Course                                           Medical Decision Making  Problems Addressed:  Puncture wound: acute illness or injury        Final diagnoses:   Puncture wound       ED Disposition  ED Disposition       ED Disposition   Discharge    Condition   Stable    Comment   --               Ave Raya MD  [address] [place] [id number] [phone]    Call   FOR FOLLOW UP         Medication List      No changes were made to your prescriptions during this visit.            Pamela Benton, SUNDAR  11/09/23 0024

## 2023-12-07 ENCOUNTER — HOSPITAL ENCOUNTER (EMERGENCY)
Facility: HOSPITAL | Age: 88
Discharge: HOME | End: 2023-12-07
Attending: EMERGENCY MEDICINE
Payer: MEDICARE

## 2023-12-07 VITALS
RESPIRATION RATE: 18 BRPM | SYSTOLIC BLOOD PRESSURE: 146 MMHG | TEMPERATURE: 97.7 F | HEART RATE: 83 BPM | OXYGEN SATURATION: 96 % | BODY MASS INDEX: 39.78 KG/M2 | HEIGHT: 64 IN | DIASTOLIC BLOOD PRESSURE: 83 MMHG | WEIGHT: 233.03 LBS

## 2023-12-07 DIAGNOSIS — S61.002S: Primary | ICD-10-CM

## 2023-12-07 PROCEDURE — 99283 EMERGENCY DEPT VISIT LOW MDM: CPT

## 2023-12-07 RX ORDER — TRANEXAMIC ACID 100 MG/ML
500 INJECTION, SOLUTION INTRAVENOUS ONCE
Status: COMPLETED | OUTPATIENT
Start: 2023-12-07 | End: 2023-12-07

## 2023-12-07 RX ADMIN — TRANEXAMIC ACID 500 MG: 100 INJECTION, SOLUTION INTRAVENOUS at 16:50

## 2023-12-07 NOTE — ED PROVIDER NOTES
How Severe Are Your Spot(S)?: mild Time: 4:30 PM EST  Date of encounter:  12/7/2023  Independent Historian/Clinical History and Information was obtained by:   Patient    History is limited by: N/A    Chief Complaint   Patient presents with    Puncture Wound         History of Present Illness:  Patient is a 89 y.o. year old female who presents to the emergency department for evaluation of puncture wound.  Patient is here by [facility] for evaluation of puncture wound that has began bleeding again.  Patient states the injury occurred approximately 1 week ago when she was peeling vegetables.  Patient denies new injury.  (Provider in triage, Darwin Adams PA-C)    Patient Care Team  Primary Care Provider: Ave Raya MD    Past Medical History:     Allergies   Allergen Reactions    Farxiga [Dapagliflozin] Shortness Of Breath    Atenolol Unknown - Low Severity    Bisoprolol Fumarate Unknown - Low Severity    Cephalexin Unknown - Low Severity    Clonidine Unknown - Low Severity    Diltiazem Hcl Unknown - Low Severity    Doxycycline Hyclate Unknown - Low Severity    Erythromycin Unknown - Low Severity    Fosinopril Unknown - Low Severity    Molds & Smuts Unknown - Low Severity    Nitrofurantoin Unknown - Low Severity    Oxybutynin Unknown - Low Severity    Tetracyclines & Related Unknown - Low Severity    Triamterene Unknown - Low Severity    Trimethoprim Unknown - Low Severity    Verapamil Unknown - Low Severity    Yeast Unknown - Low Severity     Past Medical History:   Diagnosis Date    Actinic keratosis 09/20/2016    LESIONS FROZEN TODAY    Allergic rhinitis     Arthritis     Back pain     Colitis, ulcerative     Condition not found     HERNIA    Deafness     Diastolic dysfunction     GERD (gastroesophageal reflux disease) 09/20/2016    OMEPRAZOLE FOR 4-6 MONTHS OLD, DISCUSSED RISKS    Hearing aid worn     Hearing loss     Hiatal hernia     Hypertension, essential, benign     Iron deficiency     Leg swelling     Lumbago 06/23/2015    CHRONIC,  Hpi Title: Evaluation of Skin Lesions WILL REFILL MEDS WHEN SHE IS OUT OF THE MEDS SHE GETS FROM [facility] WILL NEED UDS AT THAT TIME COMPLETED CONSENT TODAY    Seborrheic dermatitis 06/23/2015    WILL CONTINUE TREATMENT WITH KETAKONAZOLE SHAMPOO AND HAVE HER FOLLOW UP WITH DERM    Sinus trouble     SOB (shortness of breath)     Vascular dementia     Vitamin D deficiency      Past Surgical History:   Procedure Laterality Date    BLADDER SURGERY  1989    CATARACT EXTRACTION      CHOLECYSTECTOMY  1973    COLONOSCOPY  2013    ENDOSCOPY  2013    HIATAL HERNIA REPAIR      HYSTERECTOMY  1986    REPLACEMENT TOTAL KNEE Left 2012    SINUS SURGERY  1993, 1998, 2000     Family History   Problem Relation Age of Onset    Cancer Mother     Arthritis Father     Heart disease Father     Pancreatic cancer Brother 65    Diabetes Brother        Home Medications:  Prior to Admission medications    Medication Sig Start Date End Date Taking? Authorizing Provider   Ascorbic Acid (VITAMIN C PO) Vitamin C oral take 1  by oral route daily   Active    Provider, MD Sheela   chlorthalidone (HYGROTON) 25 MG tablet Take 1 tablet by mouth once daily 11/8/22   Ave Raya MD   diclofenac (VOLTAREN) 50 MG EC tablet Take 1 tablet by mouth 2 (Two) Times a Day. 10/3/22   Ave Raya MD   Diclofenac Sodium (Voltaren) 1 % gel gel Apply 4 g topically to the appropriate area as directed 4 (Four) Times a Day As Needed (hand pain). 1/28/22   Ave Raya MD   donepezil (ARICEPT) 10 MG tablet Take 1 tablet by mouth Every Evening. 7/20/21   Meri Tracey APRN   Eliquis 5 MG tablet tablet TAKE 1 TABLET BY MOUTH EVERY 12 HOURS 10/20/22   Ave Raya MD   lisinopril (PRINIVIL,ZESTRIL) 10 MG tablet Take 1 tablet by mouth once daily 9/19/22   Daisy Gillette APRN   QUEtiapine (SEROquel) 100 MG tablet Take 1 tablet by mouth 2 (Two) Times a Day. 10/9/22   ProviderSheela MD        Social History:   Social History     Tobacco Use    Smoking status: Former      "Packs/day: 0.25     Years: 1.00     Additional pack years: 0.00     Total pack years: 0.25     Types: Cigarettes     Start date: 1947     Quit date: 1947     Years since quittin.0    Smokeless tobacco: Never    Tobacco comments:     AMOUNT USED 3-4 AGE START 16 AGE STOP 17   Vaping Use    Vaping Use: Never used   Substance Use Topics    Alcohol use: Never    Drug use: Never         Review of Systems:  Review of Systems   Constitutional:  Negative for chills and fever.   Respiratory:  Negative for shortness of breath.    Gastrointestinal:  Negative for abdominal pain and vomiting.   Neurological:  Negative for dizziness, syncope and light-headedness.        Physical Exam:  /83 (BP Location: Left arm, Patient Position: Sitting)   Pulse 83   Temp 97.7 °F (36.5 °C) (Oral)   Resp 18   Ht 162.6 cm (64\")   Wt 106 kg (233 lb 0.4 oz)   LMP  (LMP Unknown)   SpO2 96%   BMI 40.00 kg/m²         Physical Exam  Vitals and nursing note reviewed.   Constitutional:       General: She is awake.      Appearance: Normal appearance.   HENT:      Head: Normocephalic and atraumatic.      Nose: Nose normal.      Mouth/Throat:      Mouth: Mucous membranes are moist.   Eyes:      Extraocular Movements: Extraocular movements intact.      Pupils: Pupils are equal, round, and reactive to light.   Cardiovascular:      Rate and Rhythm: Normal rate and regular rhythm.      Heart sounds: Normal heart sounds.   Pulmonary:      Effort: Pulmonary effort is normal. No respiratory distress.      Breath sounds: Normal breath sounds. No wheezing, rhonchi or rales.   Abdominal:      General: Bowel sounds are normal.      Palpations: Abdomen is soft.      Tenderness: There is no abdominal tenderness. There is no guarding or rebound.      Comments: No rigidity   Musculoskeletal:         General: No tenderness. Normal range of motion.      Cervical back: Normal range of motion and neck supple.      Comments: Left volar distal thumb " pad with a 0.5 cm round avulsion injury with raised edges.  There is no laceration.  This wound is persistently bleeding slowly with no pulsatile bleeding.   Skin:     General: Skin is warm and dry.      Coloration: Skin is not jaundiced.   Neurological:      General: No focal deficit present.      Mental Status: She is alert. Mental status is at baseline.   Psychiatric:         Mood and Affect: Mood normal.                      Procedures:  Procedures      Medical Decision Making:      Comorbidities that affect care:    GERD, hiatal hernia    External Notes reviewed:    Previous Clinic Note: Office visit 10/11/2023 with chronic care management services, Chelly Rea.  Description: Essential hypertension, osteoarthritis      The following orders were placed and all results were independently analyzed by me:  Orders Placed This Encounter   Procedures    I need a finger tourniquet to bedside please along with a roll of tape to create a pressure dressing over Surgicel  Nursing Communication       Medications Given in the Emergency Department:  Medications   tranexamic acid 500 MG/5ML nasal (ED/Crit Care for epistaxis) - VIAL DISPENSE 500 mg (500 mg Nasal Given 12/7/23 1650)        ED Course:    The patient was initially evaluated in the triage area where orders were placed. The patient was later dispositioned by Maciel Solorio MD.      The patient was advised to stay for completion of workup which includes but is not limited to communication of labs and radiological results, reassessment and plan. The patient was advised that leaving prior to disposition by a provider could result in critical findings that are not communicated to the patient.     ED Course as of 12/07/23 2321   Thu Dec 07, 2023   1916 Patient refuses any injection, digital block or suturing. [RP]      ED Course User Index  [RP] Maciel Solorio MD       Labs:    Lab Results (last 24 hours)       ** No results found for the last 24 hours. **              Imaging:    No Radiology Exams Resulted Within Past 24 Hours      Differential Diagnosis and Discussion:      Extremity Pain: Differential diagnosis includes but is not limited to soft tissue sprain, tendonitis, tendon injury, dislocation, fracture, deep vein thrombosis, arterial insufficiency, osteoarthritis, bursitis, and ligamentous damage.        [facility]               Patient Care Considerations:    LABS: I considered ordering labs, however patient denies systemic illness and her abrasion/superficial avulsion injury is only minimally bleeding.  She has no arterial injury.      Consultants/Shared Management Plan:    None    Social Determinants of Health:    Patient is independent, reliable, and has access to care.       Disposition and Care Coordination:    Discharged: The patient is suitable and stable for discharge with no need for consideration of observation or admission.    I have explained the patient´s condition, diagnoses and treatment plan based on the information available to me at this time. I have answered questions and addressed any concerns. The patient has a good  understanding of the patient´s diagnosis, condition, and treatment plan as can be expected at this point. The vital signs have been stable. The patient´s condition is stable and appropriate for discharge from the emergency department.      The patient will pursue further outpatient evaluation with the primary care physician or other designated or consulting physician as outlined in the discharge instructions. They are agreeable to this plan of care and follow-up instructions have been explained in detail. The patient has received these instructions in written format and have expressed an understanding of the discharge instructions. The patient is aware that any significant change in condition or worsening of symptoms should prompt an immediate return to this or the closest emergency department or call to 911.    Final diagnoses:    Avulsion of skin of left thumb, sequela        ED Disposition       ED Disposition   Discharge    Condition   Stable    Comment   --               This medical record created using voice recognition software.             Maciel Solorio MD  12/07/23 8832

## 2023-12-19 ENCOUNTER — HOSPITAL ENCOUNTER (EMERGENCY)
Facility: HOSPITAL | Age: 88
Discharge: NURSING, REHAB OR LONG-TERM CARE | End: 2023-12-19
Attending: EMERGENCY MEDICINE | Admitting: EMERGENCY MEDICINE
Payer: MEDICARE

## 2023-12-19 VITALS
HEART RATE: 74 BPM | TEMPERATURE: 98.6 F | OXYGEN SATURATION: 92 % | RESPIRATION RATE: 16 BRPM | DIASTOLIC BLOOD PRESSURE: 54 MMHG | HEIGHT: 64 IN | WEIGHT: 227.74 LBS | SYSTOLIC BLOOD PRESSURE: 142 MMHG | BODY MASS INDEX: 38.88 KG/M2

## 2023-12-19 DIAGNOSIS — S61.002S: Primary | ICD-10-CM

## 2023-12-19 PROCEDURE — 99283 EMERGENCY DEPT VISIT LOW MDM: CPT

## 2023-12-19 NOTE — ED NOTES
Attempted to call report to tatianna vasques multiple times. No answer. Discharge packet sent with family who is transporting pt back to facility.

## 2023-12-19 NOTE — ED PROVIDER NOTES
Time: 4:29 PM EST  Date of encounter:  12/19/2023  Independent Historian/Clinical History and Information was obtained by:   Patient and Family    History is limited by: N/A    Chief Complaint   Patient presents with    Finger Injury     Per EMS pt has a laceration/ruptured blistered to L thumb. Pt came from [facility] in [place]. Pt baseline is Aox2, hx of dimentia. Mechanism of injury unknown to L thumb.          History of Present Illness:  Patient is a 89 y.o. year old female who presents to the emergency department for evaluation of blister to the left thumb for the past 1 month.  Family member states that she lives in a nursing home that they are unable to keep up with the blister.  Family member is patient's son-in-law states that they have been here multiple times.  He states that she is on apixaban.  Son-in-law is asking for sutures.  No fall or injury to the finger.  He states that they keep her to quit picking at it.  Son-in-law states she has dementia.    Patient Care Team  Primary Care Provider: Ave Raya MD    Past Medical History:     Allergies   Allergen Reactions    Farxiga [Dapagliflozin] Shortness Of Breath    Atenolol Unknown - Low Severity    Bisoprolol Fumarate Unknown - Low Severity    Cephalexin Unknown - Low Severity    Clonidine Unknown - Low Severity    Diltiazem Hcl Unknown - Low Severity    Doxycycline Hyclate Unknown - Low Severity    Erythromycin Unknown - Low Severity    Fosinopril Unknown - Low Severity    Molds & Smuts Unknown - Low Severity    Nitrofurantoin Unknown - Low Severity    Oxybutynin Unknown - Low Severity    Tetracyclines & Related Unknown - Low Severity    Triamterene Unknown - Low Severity    Trimethoprim Unknown - Low Severity    Verapamil Unknown - Low Severity    Yeast Unknown - Low Severity     Past Medical History:   Diagnosis Date    Actinic keratosis 09/20/2016    LESIONS FROZEN TODAY    Allergic rhinitis     Arthritis     Back pain      Colitis, ulcerative     Condition not found     HERNIA    Deafness     Diastolic dysfunction     GERD (gastroesophageal reflux disease) 09/20/2016    OMEPRAZOLE FOR 4-6 MONTHS OLD, DISCUSSED RISKS    Hearing aid worn     Hearing loss     Hiatal hernia     Hypertension, essential, benign     Iron deficiency     Leg swelling     Lumbago 06/23/2015    CHRONIC, WILL REFILL MEDS WHEN SHE IS OUT OF THE MEDS SHE GETS FROM [facility] WILL NEED UDS AT THAT TIME COMPLETED CONSENT TODAY    Seborrheic dermatitis 06/23/2015    WILL CONTINUE TREATMENT WITH KETAKONAZOLE SHAMPOO AND HAVE HER FOLLOW UP WITH DERM    Sinus trouble     SOB (shortness of breath)     Vascular dementia     Vitamin D deficiency      Past Surgical History:   Procedure Laterality Date    BLADDER SURGERY  1989    CATARACT EXTRACTION      CHOLECYSTECTOMY  1973    COLONOSCOPY  2013    ENDOSCOPY  2013    HIATAL HERNIA REPAIR      HYSTERECTOMY  1986    REPLACEMENT TOTAL KNEE Left 2012    SINUS SURGERY  1993, 1998, 2000     Family History   Problem Relation Age of Onset    Cancer Mother     Arthritis Father     Heart disease Father     Pancreatic cancer Brother 65    Diabetes Brother        Home Medications:  Prior to Admission medications    Medication Sig Start Date End Date Taking? Authorizing Provider   Ascorbic Acid (VITAMIN C PO) Vitamin C oral take 1  by oral route daily   Active    Provider, MD Sheela   chlorthalidone (HYGROTON) 25 MG tablet Take 1 tablet by mouth once daily 11/8/22   Ave Raya MD   diclofenac (VOLTAREN) 50 MG EC tablet Take 1 tablet by mouth 2 (Two) Times a Day. 10/3/22   Ave Raya MD   Diclofenac Sodium (Voltaren) 1 % gel gel Apply 4 g topically to the appropriate area as directed 4 (Four) Times a Day As Needed (hand pain). 1/28/22   Ave Raya MD   donepezil (ARICEPT) 10 MG tablet Take 1 tablet by mouth Every Evening. 7/20/21   Meri Tracey APRN   Eliquis 5 MG tablet tablet TAKE 1 TABLET BY MOUTH EVERY  "12 HOURS 10/20/22   Ave Raya MD   lisinopril (PRINIVIL,ZESTRIL) 10 MG tablet Take 1 tablet by mouth once daily 22   Daisy Gillette APRN   QUEtiapine (SEROquel) 100 MG tablet Take 1 tablet by mouth 2 (Two) Times a Day. 10/9/22   ProviderSheela MD        Social History:   Social History     Tobacco Use    Smoking status: Former     Packs/day: 0.25     Years: 1.00     Additional pack years: 0.00     Total pack years: 0.25     Types: Cigarettes     Start date: 1947     Quit date: 1947     Years since quittin.1    Smokeless tobacco: Never    Tobacco comments:     AMOUNT USED 3-4 AGE START 16 AGE STOP 17   Vaping Use    Vaping Use: Never used   Substance Use Topics    Alcohol use: Never    Drug use: Never         Review of Systems:  Review of Systems   Constitutional: Negative.    HENT: Negative.     Eyes: Negative.    Respiratory: Negative.     Cardiovascular: Negative.    Gastrointestinal: Negative.    Endocrine: Negative.    Genitourinary: Negative.    Musculoskeletal: Negative.    Skin:  Positive for color change.   Allergic/Immunologic: Negative.    Neurological: Negative.    Hematological: Negative.    Psychiatric/Behavioral: Negative.          Physical Exam:  /54   Pulse 74   Temp 98.6 °F (37 °C) (Oral)   Resp 16   Ht 162.6 cm (64\")   Wt 103 kg (227 lb 11.8 oz)   LMP  (LMP Unknown)   SpO2 92%   BMI 39.09 kg/m²         Physical Exam  Vitals and nursing note reviewed.   Constitutional:       Appearance: Normal appearance.   HENT:      Head: Normocephalic and atraumatic.      Nose: Nose normal.      Mouth/Throat:      Mouth: Mucous membranes are moist.   Eyes:      Extraocular Movements: Extraocular movements intact.      Conjunctiva/sclera: Conjunctivae normal.      Pupils: Pupils are equal, round, and reactive to light.   Cardiovascular:      Rate and Rhythm: Normal rate and regular rhythm.      Heart sounds: Normal heart sounds.   Pulmonary:      Effort: Pulmonary " effort is normal.      Breath sounds: Normal breath sounds.   Musculoskeletal:         General: Normal range of motion.      Left hand: No swelling or tenderness. Normal range of motion. Normal capillary refill. Normal pulse.        Hands:       Cervical back: Normal range of motion and neck supple.   Skin:     General: Skin is warm and dry.      Capillary Refill: Capillary refill takes less than 2 seconds.      Findings: Erythema present.   Neurological:      General: No focal deficit present.      Mental Status: She is alert and oriented to person, place, and time.   Psychiatric:         Mood and Affect: Mood normal.         Behavior: Behavior normal.                Procedures:  Procedures      Medical Decision Making:      Comorbidities that affect care:    Hypertension    External Notes reviewed:    Previous ED Note: ED visit December 7, 2023 for avulsion of skin      The following orders were placed and all results were independently analyzed by me:  Orders Placed This Encounter   Procedures    Ambulatory Referral to Wound Clinic    Clean wound, apply surgicel with non adherent and then coband  Misc Nursing Order (Specify)       Medications Given in the Emergency Department:  Medications - No data to display     ED Course:    The patient was initially evaluated in the triage area where orders were placed. The patient was later dispositioned by Suha Waite PA-C.      The patient was advised to stay for completion of workup which includes but is not limited to communication of labs and radiological results, reassessment and plan. The patient was advised that leaving prior to disposition by a provider could result in critical findings that are not communicated to the patient.          Labs:    Lab Results (last 24 hours)       ** No results found for the last 24 hours. **             Imaging:    No Radiology Exams Resulted Within Past 24 Hours      Differential Diagnosis and Discussion:      Laceration:  Laceration was evaluated for arterial injury, ligamentous damage, and other neurovascular injury.        MDM               Patient Care Considerations:    Xray l thumb however patient has full ROM and injury x 1 month.       Consultants/Shared Management Plan:    None    Social Determinants of Health:    Patient is a nursing home/assisted living resident and has reliable access to care.      Disposition and Care Coordination:    Discharged: The patient is suitable and stable for discharge with no need for consideration of observation or admission.    I have explained the patient´s condition, diagnoses and treatment plan based on the information available to me at this time. I have answered questions and addressed any concerns. The patient has a good  understanding of the patient´s diagnosis, condition, and treatment plan as can be expected at this point. The vital signs have been stable. The patient´s condition is stable and appropriate for discharge from the emergency department.      The patient will pursue further outpatient evaluation with the primary care physician or other designated or consulting physician as outlined in the discharge instructions. They are agreeable to this plan of care and follow-up instructions have been explained in detail. The patient has received these instructions in written format and have expressed an understanding of the discharge instructions. The patient is aware that any significant change in condition or worsening of symptoms should prompt an immediate return to this or the closest emergency department or call to 911.  I have explained discharge medications and the need for follow up with the patient/caretakers. This was also printed in the discharge instructions. Patient was discharged with the following medications and follow up:      Medication List      No changes were made to your prescriptions during this visit.      [facility]  [facility] [place] [address] [phone]           Final diagnoses:   Avulsion of skin of left thumb without complication, sequela        ED Disposition       ED Disposition   Discharge    Condition   Stable    Comment   --               This medical record created using voice recognition software.             Suha Waite PA-C  12/19/23 7900

## 2023-12-19 NOTE — DISCHARGE INSTRUCTIONS
Due to the injury occurring a month ago we are not able to suture it.  The nursing home should be able to address the wound without bringing her to the ED.  We have sent extra supplies if needed.  Leave the Surgicel on.  Do not peel it off or you will reopen the wound.  I have given a referral to wound care if needed

## 2023-12-22 ENCOUNTER — TELEPHONE (OUTPATIENT)
Dept: INTERNAL MEDICINE | Facility: CLINIC | Age: 88
End: 2023-12-22
Payer: MEDICARE

## 2023-12-22 NOTE — TELEPHONE ENCOUNTER
Caller: [employer]    Relationship: APRN WITH [facility]    Best call back number: [phone]    Who are you requesting to speak with (clinical staff, provider,  specific staff member): CLINICAL     What was the call regarding: CALLER STATED THAT SHE IS NEEDING TO DISCUSS ELIQUIS USE

## 2023-12-22 NOTE — TELEPHONE ENCOUNTER
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 Chelly stated pt has been picking at her finger, and when she picks it causes it to bleed profusely. Chelly stated she discontinued the Eliquis for the pt and Chelly mentioned it to family and family requested Chelly speak with provider. I spoke with provider, provider recommended stopping the Eliquis for 7-10 days, no longer than 10 days to let the finger heal and restart the Eliquis. Returned call to Chelly KWOK to let her know providers recommendations, she did not answer so I left a voicemail for a return call to the office.

## 2023-12-22 NOTE — TELEPHONE ENCOUNTER
Reviewed.  I do think it is appropriate to consider stopping the Eliquis as I could not find documentation of more than 1 clot however if she knows that she has had more than 1 typically we would stay on it.  As she is developing dementia it is also okay to stop therefore it is truly patient preference as to plan of care.

## 2024-01-17 NOTE — TELEPHONE ENCOUNTER
Spoke with patients daughter Ros who stated that the patient is no longer taking the Eliquis. The Eliquis was stopped by the APRN at [facility]. Patient is doing well with out being on it.

## 2024-04-22 ENCOUNTER — TRANSCRIBE ORDERS (OUTPATIENT)
Dept: ADMINISTRATIVE | Facility: HOSPITAL | Age: 89
End: 2024-04-22
Payer: MEDICARE

## 2024-04-22 ENCOUNTER — HOSPITAL ENCOUNTER (OUTPATIENT)
Dept: CT IMAGING | Facility: HOSPITAL | Age: 89
Discharge: HOME | End: 2024-04-22
Admitting: INTERNAL MEDICINE
Payer: MEDICARE

## 2024-04-22 DIAGNOSIS — I82.401: Primary | ICD-10-CM

## 2024-04-22 DIAGNOSIS — I82.401 ACUTE EMBOLISM AND THROMBOSIS OF DEEP VEIN OF RIGHT LOWER EXTREMITY: ICD-10-CM

## 2024-04-22 DIAGNOSIS — I82.401 ACUTE EMBOLISM AND THROMBOSIS OF DEEP VEIN OF RIGHT LOWER EXTREMITY: Primary | ICD-10-CM

## 2024-04-22 PROCEDURE — 71250 CT THORAX DX C-: CPT
